# Patient Record
Sex: FEMALE | Race: AMERICAN INDIAN OR ALASKA NATIVE | Employment: OTHER | URBAN - METROPOLITAN AREA
[De-identification: names, ages, dates, MRNs, and addresses within clinical notes are randomized per-mention and may not be internally consistent; named-entity substitution may affect disease eponyms.]

---

## 2017-03-10 ENCOUNTER — HOSPITAL ENCOUNTER (EMERGENCY)
Age: 67
Discharge: HOME | End: 2017-03-10
Attending: EMERGENCY MEDICINE
Payer: MEDICARE

## 2017-03-10 ENCOUNTER — APPOINTMENT (OUTPATIENT)
Dept: GENERAL RADIOLOGY | Age: 67
End: 2017-03-10
Attending: EMERGENCY MEDICINE
Payer: MEDICARE

## 2017-03-10 VITALS
TEMPERATURE: 98.2 F | OXYGEN SATURATION: 100 % | BODY MASS INDEX: 40.05 KG/M2 | SYSTOLIC BLOOD PRESSURE: 118 MMHG | WEIGHT: 204 LBS | DIASTOLIC BLOOD PRESSURE: 92 MMHG | HEIGHT: 60 IN | HEART RATE: 72 BPM | RESPIRATION RATE: 23 BRPM

## 2017-03-10 DIAGNOSIS — J44.1 ACUTE EXACERBATION OF CHRONIC OBSTRUCTIVE PULMONARY DISEASE (COPD) (HCC): Primary | ICD-10-CM

## 2017-03-10 LAB
ANION GAP BLD CALC-SCNC: 6 MMOL/L (ref 3–18)
BASOPHILS # BLD AUTO: 0 K/UL (ref 0–0.1)
BASOPHILS # BLD: 1 % (ref 0–2)
BUN SERPL-MCNC: 16 MG/DL (ref 7–18)
BUN/CREAT SERPL: 17 (ref 12–20)
CALCIUM SERPL-MCNC: 8.9 MG/DL (ref 8.5–10.1)
CHLORIDE SERPL-SCNC: 105 MMOL/L (ref 100–108)
CO2 SERPL-SCNC: 28 MMOL/L (ref 21–32)
CREAT SERPL-MCNC: 0.95 MG/DL (ref 0.6–1.3)
DIFFERENTIAL METHOD BLD: ABNORMAL
EOSINOPHIL # BLD: 0.3 K/UL (ref 0–0.4)
EOSINOPHIL NFR BLD: 3 % (ref 0–5)
ERYTHROCYTE [DISTWIDTH] IN BLOOD BY AUTOMATED COUNT: 12.8 % (ref 11.6–14.5)
GLUCOSE SERPL-MCNC: 136 MG/DL (ref 74–99)
HCT VFR BLD AUTO: 41.8 % (ref 35–45)
HGB BLD-MCNC: 14.2 G/DL (ref 12–16)
LYMPHOCYTES # BLD AUTO: 26 % (ref 21–52)
LYMPHOCYTES # BLD: 2.1 K/UL (ref 0.9–3.6)
MCH RBC QN AUTO: 30.2 PG (ref 24–34)
MCHC RBC AUTO-ENTMCNC: 34 G/DL (ref 31–37)
MCV RBC AUTO: 88.9 FL (ref 74–97)
MONOCYTES # BLD: 0.9 K/UL (ref 0.05–1.2)
MONOCYTES NFR BLD AUTO: 11 % (ref 3–10)
NEUTS SEG # BLD: 4.8 K/UL (ref 1.8–8)
NEUTS SEG NFR BLD AUTO: 59 % (ref 40–73)
PLATELET # BLD AUTO: 250 K/UL (ref 135–420)
PMV BLD AUTO: 10.4 FL (ref 9.2–11.8)
POTASSIUM SERPL-SCNC: 4.8 MMOL/L (ref 3.5–5.5)
RBC # BLD AUTO: 4.7 M/UL (ref 4.2–5.3)
SODIUM SERPL-SCNC: 139 MMOL/L (ref 136–145)
WBC # BLD AUTO: 8.1 K/UL (ref 4.6–13.2)

## 2017-03-10 PROCEDURE — 77030012341 HC CHMB SPCR OPTC MDI VYRM -A

## 2017-03-10 PROCEDURE — 85025 COMPLETE CBC W/AUTO DIFF WBC: CPT | Performed by: EMERGENCY MEDICINE

## 2017-03-10 PROCEDURE — 93005 ELECTROCARDIOGRAM TRACING: CPT

## 2017-03-10 PROCEDURE — 74011000250 HC RX REV CODE- 250: Performed by: EMERGENCY MEDICINE

## 2017-03-10 PROCEDURE — 94640 AIRWAY INHALATION TREATMENT: CPT

## 2017-03-10 PROCEDURE — 80048 BASIC METABOLIC PNL TOTAL CA: CPT | Performed by: EMERGENCY MEDICINE

## 2017-03-10 PROCEDURE — 77030013140 HC MSK NEB VYRM -A

## 2017-03-10 PROCEDURE — 71010 XR CHEST PORT: CPT

## 2017-03-10 PROCEDURE — 99285 EMERGENCY DEPT VISIT HI MDM: CPT

## 2017-03-10 RX ORDER — ALBUTEROL SULFATE 0.83 MG/ML
2.5 SOLUTION RESPIRATORY (INHALATION)
Status: COMPLETED | OUTPATIENT
Start: 2017-03-10 | End: 2017-03-10

## 2017-03-10 RX ORDER — IPRATROPIUM BROMIDE AND ALBUTEROL SULFATE 2.5; .5 MG/3ML; MG/3ML
3 SOLUTION RESPIRATORY (INHALATION) ONCE
Status: COMPLETED | OUTPATIENT
Start: 2017-03-10 | End: 2017-03-10

## 2017-03-10 RX ADMIN — ALBUTEROL SULFATE 2.5 MG: 2.5 SOLUTION RESPIRATORY (INHALATION) at 20:26

## 2017-03-10 RX ADMIN — ALBUTEROL SULFATE 2.5 MG: 2.5 SOLUTION RESPIRATORY (INHALATION) at 21:48

## 2017-03-10 RX ADMIN — IPRATROPIUM BROMIDE AND ALBUTEROL SULFATE 3 ML: .5; 3 SOLUTION RESPIRATORY (INHALATION) at 20:26

## 2017-03-11 LAB
ATRIAL RATE: 71 BPM
CALCULATED P AXIS, ECG09: 70 DEGREES
CALCULATED R AXIS, ECG10: 49 DEGREES
CALCULATED T AXIS, ECG11: 88 DEGREES
DIAGNOSIS, 93000: NORMAL
P-R INTERVAL, ECG05: 188 MS
Q-T INTERVAL, ECG07: 402 MS
QRS DURATION, ECG06: 78 MS
QTC CALCULATION (BEZET), ECG08: 436 MS
VENTRICULAR RATE, ECG03: 71 BPM

## 2017-03-11 NOTE — DISCHARGE INSTRUCTIONS
Chronic Obstructive Pulmonary Disease (COPD): Care Instructions  Your Care Instructions    Chronic obstructive pulmonary disease (COPD) is a general term for a group of lung diseases, including emphysema and chronic bronchitis. People with COPD have decreased airflow in and out of the lungs, which makes it hard to breathe. The airways also can get clogged with thick mucus. Cigarette smoking is a major cause of COPD. Although there is no cure for COPD, you can slow its progress. Following your treatment plan and taking care of yourself can help you feel better and live longer. Follow-up care is a key part of your treatment and safety. Be sure to make and go to all appointments, and call your doctor if you are having problems. It's also a good idea to know your test results and keep a list of the medicines you take. How can you care for yourself at home? Staying healthy  · Do not smoke. This is the most important step you can take to prevent more damage to your lungs. If you need help quitting, talk to your doctor about stop-smoking programs and medicines. These can increase your chances of quitting for good. · Avoid colds and flu. Get a pneumococcal vaccine shot. If you have had one before, ask your doctor whether you need a second dose. Get the flu vaccine every fall. If you must be around people with colds or the flu, wash your hands often. · Avoid secondhand smoke, air pollution, and high altitudes. Also avoid cold, dry air and hot, humid air. Stay at home with your windows closed when air pollution is bad. Medicines and oxygen therapy  · Take your medicines exactly as prescribed. Call your doctor if you think you are having a problem with your medicine. · You may be taking medicines such as:  ¨ Bronchodilators. These help open your airways and make breathing easier. Bronchodilators are either short-acting (work for 6 to 9 hours) or long-acting (work for 24 hours).  You inhale most bronchodilators, so they start to act quickly. Always carry your quick-relief inhaler with you in case you need it while you are away from home. ¨ Corticosteroids (prednisone, budesonide). These reduce airway inflammation. They come in pill or inhaled form. You must take these medicines every day for them to work well. · A spacer may help you get more inhaled medicine to your lungs. Ask your doctor or pharmacist if a spacer is right for you. If it is, ask how to use it properly. · Do not take any vitamins, over-the-counter medicine, or herbal products without talking to your doctor first.  · If your doctor prescribed antibiotics, take them as directed. Do not stop taking them just because you feel better. You need to take the full course of antibiotics. · Oxygen therapy boosts the amount of oxygen in your blood and helps you breathe easier. Use the flow rate your doctor has recommended, and do not change it without talking to your doctor first.  Activity  · Get regular exercise. Walking is an easy way to get exercise. Start out slowly, and walk a little more each day. · Pay attention to your breathing. You are exercising too hard if you cannot talk while you are exercising. · Take short rest breaks when doing household chores and other activities. · Learn breathing methods--such as breathing through pursed lips--to help you become less short of breath. · If your doctor has not set you up with a pulmonary rehabilitation program, talk to him or her about whether rehab is right for you. Rehab includes exercise programs, education about your disease and how to manage it, help with diet and other changes, and emotional support. Diet  · Eat regular, healthy meals. Use bronchodilators about 1 hour before you eat to make it easier to eat. Eat several small meals instead of three large ones. Drink beverages at the end of the meal. Avoid foods that are hard to chew.   · Eat foods that contain protein so that you do not lose muscle mass.  Mental health  · Talk to your family, friends, or a therapist about your feelings. It is normal to feel frightened, angry, hopeless, helpless, and even guilty. Talking openly about bad feelings can help you cope. If these feelings last, talk to your doctor. When should you call for help? Call 911 anytime you think you may need emergency care. For example, call if:  · You have severe trouble breathing. Call your doctor now or seek immediate medical care if:  · You have new or worse trouble breathing. · You cough up blood. · You have a fever. Watch closely for changes in your health, and be sure to contact your doctor if:  · You cough more deeply or more often, especially if you notice more mucus or a change in the color of your mucus. · You have new or worse swelling in your legs or belly. · You are not getting better as expected. Where can you learn more? Go to http://ced-chato.info/. Ashish Long in the search box to learn more about \"Chronic Obstructive Pulmonary Disease (COPD): Care Instructions. \"  Current as of: May 23, 2016  Content Version: 11.1  © 2010-0850 [employer]. Care instructions adapted under license by [employer] (which disclaims liability or warranty for this information). If you have questions about a medical condition or this instruction, always ask your healthcare professional. [address] any warranty or liability for your use of this information.

## 2017-03-11 NOTE — ED TRIAGE NOTES
Pt,  C/o increase  Shortness of breath   This afternoon,  States  She had  COPD, short of breath most of the time

## 2017-03-11 NOTE — ED NOTES
Pt arrived to the ED with co SOB and cough. Pt has a PMH of asthma, COPD, Emphysema, and Bronchitis.  Pt denies CP

## 2017-03-11 NOTE — ED PROVIDER NOTES
HPI Comments: 8:13 PM Una Saucedo is a 78 y.o. female with a history of COPD, asthma, hypertension, GERD who presents to ED complaining of shortness of breath that started this afternoon. Patient states that she was coming out of the shower today and had difficulty breathing. Patient states that she used her Albuterol with no relief. Patient also reports an associated cough. Patient states that other than Albuterol, she has only taken her blood pressure medication. Patient denies chest pain, fevers, chills, leg swelling. No other complaints, associated symptoms or modifying factors at this time. PCP: Janny Mas MD    The history is provided by the patient. Past Medical History:   Diagnosis Date    Arthritis     Asthma     COPD (chronic obstructive pulmonary disease) ([address]     Gall stones     GERD (gastroesophageal reflux disease)     HTN (hypertension)     Kidney stone     Microscopic hematuria     Serious allergic reaction with severe difficult breathing 12/2011       Past Surgical History:   Procedure Laterality Date    COLONOSCOPY N/A 6/14/2016    COLONOSCOPY, DIAGNOSTIC w/bx performed by Nneka Monzon MD at [facility] ENDOSCOPY    HX CHOLECYSTECTOMY      HX GYN      HX ORTHOPAEDIC      HX UROLOGICAL  4/22/2014    [facility], Dr. Liliya Donis, Cystoscopy, left retrograde, left double-J catheter         Family History:   Problem Relation Age of Onset    Heart Disease Mother     Lung Disease Mother     Heart Disease Sister     Cancer Brother        Social History     Social History    Marital status:      Spouse name: N/A    Number of children: N/A    Years of education: N/A     Occupational History    Not on file.      Social History Main Topics    Smoking status: Former Smoker     Types: Cigarettes     Quit date: 3/24/2012    Smokeless tobacco: Not on file      Comment: 1 pk a day stopped    Alcohol use Yes      Comment: socially    Drug use: No    Sexual activity: Yes     Partners: Male     Other Topics Concern    Not on file     Social History Narrative    ** Merged History Encounter **              ALLERGIES: Prednisone    Review of Systems   Constitutional: Negative. Negative for activity change, appetite change, chills and fever. HENT: Negative for congestion, ear discharge, ear pain, facial swelling, nosebleeds, postnasal drip, sinus pressure, sneezing and tinnitus. Eyes: Negative for pain, discharge, redness and visual disturbance. Respiratory: Positive for cough and shortness of breath. Negative for apnea, choking, chest tightness, wheezing and stridor. Cardiovascular: Negative for chest pain and leg swelling. Gastrointestinal: Negative for abdominal distention, abdominal pain, anal bleeding, blood in stool, constipation, diarrhea, nausea and vomiting. Genitourinary: Negative for decreased urine volume, difficulty urinating, dyspareunia, dysuria, flank pain, frequency, hematuria, pelvic pain, urgency, vaginal bleeding and vaginal discharge. Musculoskeletal: Negative for arthralgias, back pain, gait problem, joint swelling, myalgias, neck pain and neck stiffness. Skin: Negative for color change. Neurological: Negative for dizziness, tremors, seizures, speech difficulty, weakness, numbness and headaches. Hematological: Negative for adenopathy. Does not bruise/bleed easily. Psychiatric/Behavioral: Negative for agitation, dysphoric mood and self-injury. The patient is not nervous/anxious. Vitals:    03/10/17 2030 03/10/17 2045 03/10/17 2100 03/10/17 2115   BP: 157/79 164/70 159/64 131/64   Pulse: 73 73 79 76   Resp: 30 19 18 19   Temp:       SpO2: 100% 98% 97% 96%   Weight:       Height:                Physical Exam   Constitutional: She is oriented to person, place, and time. She appears well-developed and well-nourished. Patient is overweight. HENT:   Head: Normocephalic and atraumatic.    Right Ear: External ear normal. Left Ear: External ear normal.   Nose: Nose normal.   Mouth/Throat: Oropharynx is clear and moist.   Eyes: Conjunctivae and EOM are normal. Pupils are equal, round, and reactive to light. Neck: Normal range of motion. Neck supple. Cardiovascular: Regular rhythm, normal heart sounds and intact distal pulses. Pulmonary/Chest: Effort normal. No respiratory distress. She has wheezes (2+). She has no rales. She exhibits no tenderness. Distant breath sounds. Abdominal: Soft. Bowel sounds are normal. She exhibits no distension and no mass. There is no tenderness. There is no rebound and no guarding. Musculoskeletal: Normal range of motion. She exhibits no edema. Neurological: She is alert and oriented to person, place, and time. Skin: Skin is warm and dry. No rash noted. No erythema. Psychiatric: She has a normal mood and affect. Her behavior is normal. Judgment normal.   Nursing note and vitals reviewed. MDM  Number of Diagnoses or Management Options  Acute exacerbation of chronic obstructive pulmonary disease (COPD) [facility]):   Diagnosis management comments: 78year old with COPD, presents with dyspnea and wheezing Shortness of breath noted. DDx CHF, COPD, clot, infection, hemorrhage, other etiologies. I reevaluated the patient at 21:40 and she informed me that her shortness of breath has improved. Will discharge patient.               Amount and/or Complexity of Data Reviewed  Clinical lab tests: ordered  Tests in the radiology section of CPT®: ordered    Risk of Complications, Morbidity, and/or Mortality  Presenting problems: moderate      ED Course       Procedures    Vitals:  Patient Vitals for the past 12 hrs:   Temp Pulse Resp BP SpO2   03/10/17 2115 - 76 19 131/64 96 %   03/10/17 2100 - 79 18 159/64 97 %   03/10/17 2045 - 73 19 164/70 98 %   03/10/17 2030 - 73 30 157/79 100 %   03/10/17 2015 - 73 21 136/82 96 %   03/10/17 2000 - 70 22 146/69 96 %   03/10/17 1931 - 77 (!) 31 143/64 99 % 03/10/17 1910 98.2 °F (36.8 °C) 76 24 186/77 95 %     99% on RA, indicating adequate oxygenation. Medications ordered:   Medications   inhalational spacing device (not administered)   albuterol (PROVENTIL VENTOLIN) nebulizer solution 2.5 mg (not administered)   albuterol-ipratropium (DUO-NEB) 2.5 MG-0.5 MG/3 ML (3 mL Nebulization Given 3/10/17 2026)   albuterol (PROVENTIL VENTOLIN) nebulizer solution 2.5 mg (2.5 mg Nebulization Given 3/10/17 2026)         Lab findings:  Recent Results (from the past 12 hour(s))   CBC WITH AUTOMATED DIFF    Collection Time: 03/10/17  7:45 PM   Result Value Ref Range    WBC 8.1 4.6 - 13.2 K/uL    RBC 4.70 4.20 - 5.30 M/uL    HGB 14.2 12.0 - 16.0 g/dL    HCT 41.8 35.0 - 45.0 %    MCV 88.9 74.0 - 97.0 FL    MCH 30.2 24.0 - 34.0 PG    MCHC 34.0 31.0 - 37.0 g/dL    RDW 12.8 11.6 - 14.5 %    PLATELET 288 224 - 411 K/uL    MPV 10.4 9.2 - 11.8 FL    NEUTROPHILS 59 40 - 73 %    LYMPHOCYTES 26 21 - 52 %    MONOCYTES 11 (H) 3 - 10 %    EOSINOPHILS 3 0 - 5 %    BASOPHILS 1 0 - 2 %    ABS. NEUTROPHILS 4.8 1.8 - 8.0 K/UL    ABS. LYMPHOCYTES 2.1 0.9 - 3.6 K/UL    ABS. MONOCYTES 0.9 0.05 - 1.2 K/UL    ABS. EOSINOPHILS 0.3 0.0 - 0.4 K/UL    ABS. BASOPHILS 0.0 0.0 - 0.1 K/UL    DF AUTOMATED     METABOLIC PANEL, BASIC    Collection Time: 03/10/17  7:45 PM   Result Value Ref Range    Sodium 139 136 - 145 mmol/L    Potassium 4.8 3.5 - 5.5 mmol/L    Chloride 105 100 - 108 mmol/L    CO2 28 21 - 32 mmol/L    Anion gap 6 3.0 - 18 mmol/L    Glucose 136 (H) 74 - 99 mg/dL    BUN 16 7.0 - 18 MG/DL    Creatinine 0.95 0.6 - 1.3 MG/DL    BUN/Creatinine ratio 17 12 - 20      GFR est AA >60 >60 ml/min/1.73m2    GFR est non-AA 59 (L) >60 ml/min/1.73m2    Calcium 8.9 8.5 - 10.1 MG/DL       EKG interpretation by ED Physician:  EKG was interpreted by me at 20:13 and showed nothing significant. No STEMI.      X-Ray, CT or other radiology findings or impressions:  XR CHEST PORT    (Results Pending)     XR CHEST PORT was interpreted by me at 21:07 and showed no acute changes. Patient does have an old fractured rib. Reevaluation of patient:   Patient informed me that her symptoms have improved. I informed the patient of the plan for discharge and follow up. Patient voiced understanding and was amendable to the proposed plan. All questions were answered. Diagnosis:   1. Acute exacerbation of chronic obstructive pulmonary disease (COPD) ([facility])        Disposition: Discharged in stable condition. I gave the patient strict verbal and written instructions for discharge, follow up, and to return to the emergency department for any new or worsening signs or symptoms. Follow-up Information     None           Patient's Medications   Start Taking    No medications on file   Continue Taking    ALBUTEROL (PROVENTIL HFA, VENTOLIN HFA, PROAIR HFA) 90 MCG/ACTUATION INHALER    Take 2 Puffs by inhalation every four (4) hours as needed for Wheezing or Shortness of Breath (Cough). CARVEDILOL (COREG) 25 MG TABLET        FLUTICASONE-SALMETEROL (ADVAIR DISKUS) 500-50 MCG/DOSE DISKUS INHALER    Take 1 Puff by inhalation every twelve (12) hours. INHALATIONAL SPACING DEVICE    ALWAYS USE WITH INHALER    POTASSIUM CITRATE (UROCIT-K 15) 15 MEQ TBER    Take 15 mEq by mouth two (2) times a day. QUINAPRIL (ACCUPRIL) 20 MG TABLET    Take 1 Tab by mouth nightly. ROSUVASTATIN (CRESTOR) 20 MG TABLET    Take 1 Tab by mouth nightly. These Medications have changed    No medications on file   Stop Taking    No medications on file       Scribe [address] [place] [address] for and in the presence of Chitra Freitas MD 8:14 PM, 03/10/17. Signed by: Malvin Owen[address], 8:14 PM, 03/10/17. Provider Attestation:   I personally performed the services described in the documentation, reviewed the documentation, as recorded by the scribe in my presence, and it accurately and completely records my words and actions.  March 10, 2017 at 9:53 PM - Marilu San MD

## 2018-11-18 ENCOUNTER — APPOINTMENT (OUTPATIENT)
Dept: CT IMAGING | Age: 68
DRG: 065 | End: 2018-11-18
Attending: EMERGENCY MEDICINE
Payer: MEDICARE

## 2018-11-18 ENCOUNTER — HOSPITAL ENCOUNTER (INPATIENT)
Age: 68
LOS: 2 days | Discharge: HOME HEALTH | DRG: 065 | End: 2018-11-20
Attending: EMERGENCY MEDICINE | Admitting: INTERNAL MEDICINE
Payer: MEDICARE

## 2018-11-18 ENCOUNTER — APPOINTMENT (OUTPATIENT)
Dept: MRI IMAGING | Age: 68
DRG: 065 | End: 2018-11-18
Attending: INTERNAL MEDICINE
Payer: MEDICARE

## 2018-11-18 ENCOUNTER — APPOINTMENT (OUTPATIENT)
Dept: GENERAL RADIOLOGY | Age: 68
DRG: 065 | End: 2018-11-18
Attending: EMERGENCY MEDICINE
Payer: MEDICARE

## 2018-11-18 DIAGNOSIS — G45.9 TIA (TRANSIENT ISCHEMIC ATTACK): Primary | ICD-10-CM

## 2018-11-18 LAB
ALBUMIN SERPL-MCNC: 4.2 G/DL (ref 3.4–5)
ALBUMIN/GLOB SERPL: 0.9 {RATIO} (ref 0.8–1.7)
ALP SERPL-CCNC: 90 U/L (ref 45–117)
ALT SERPL-CCNC: 59 U/L (ref 13–56)
ANION GAP SERPL CALC-SCNC: 6 MMOL/L (ref 3–18)
AST SERPL-CCNC: 44 U/L (ref 15–37)
ATRIAL RATE: 91 BPM
BASOPHILS # BLD: 0 K/UL (ref 0–0.06)
BASOPHILS NFR BLD: 0 % (ref 0–3)
BILIRUB SERPL-MCNC: 0.4 MG/DL (ref 0.2–1)
BUN SERPL-MCNC: 13 MG/DL (ref 7–18)
BUN/CREAT SERPL: 13 (ref 12–20)
CALCIUM SERPL-MCNC: 8.7 MG/DL (ref 8.5–10.1)
CALCULATED P AXIS, ECG09: 64 DEGREES
CALCULATED R AXIS, ECG10: 31 DEGREES
CALCULATED T AXIS, ECG11: 102 DEGREES
CHLORIDE SERPL-SCNC: 107 MMOL/L (ref 100–108)
CK MB CFR SERPL CALC: 2.2 % (ref 0–4)
CK MB SERPL-MCNC: 1 NG/ML (ref 5–25)
CK SERPL-CCNC: 45 U/L (ref 26–192)
CO2 SERPL-SCNC: 24 MMOL/L (ref 21–32)
CREAT SERPL-MCNC: 1 MG/DL (ref 0.6–1.3)
DIAGNOSIS, 93000: NORMAL
DIFFERENTIAL METHOD BLD: ABNORMAL
EOSINOPHIL # BLD: 0.2 K/UL (ref 0–0.4)
EOSINOPHIL NFR BLD: 2 % (ref 0–5)
ERYTHROCYTE [DISTWIDTH] IN BLOOD BY AUTOMATED COUNT: 12.5 % (ref 11.6–14.5)
GLOBULIN SER CALC-MCNC: 4.5 G/DL (ref 2–4)
GLUCOSE BLD STRIP.AUTO-MCNC: 176 MG/DL (ref 70–110)
GLUCOSE BLD STRIP.AUTO-MCNC: 176 MG/DL (ref 70–110)
GLUCOSE SERPL-MCNC: 170 MG/DL (ref 74–99)
HCT VFR BLD AUTO: 45.1 % (ref 35–45)
HGB BLD-MCNC: 15.5 G/DL (ref 12–16)
LYMPHOCYTES # BLD: 3.7 K/UL (ref 0.8–3.5)
LYMPHOCYTES NFR BLD: 38 % (ref 20–51)
MCH RBC QN AUTO: 29.8 PG (ref 24–34)
MCHC RBC AUTO-ENTMCNC: 34.4 G/DL (ref 31–37)
MCV RBC AUTO: 86.7 FL (ref 74–97)
MONOCYTES # BLD: 0.1 K/UL (ref 0–1)
MONOCYTES NFR BLD: 1 % (ref 2–9)
NEUTS SEG # BLD: 5.7 K/UL (ref 1.8–8)
NEUTS SEG NFR BLD: 59 % (ref 42–75)
P-R INTERVAL, ECG05: 186 MS
PLATELET # BLD AUTO: 250 K/UL (ref 135–420)
PLATELET COMMENTS,PCOM: ABNORMAL
PMV BLD AUTO: 10.5 FL (ref 9.2–11.8)
POTASSIUM SERPL-SCNC: 3.9 MMOL/L (ref 3.5–5.5)
PROT SERPL-MCNC: 8.7 G/DL (ref 6.4–8.2)
Q-T INTERVAL, ECG07: 360 MS
QRS DURATION, ECG06: 72 MS
QTC CALCULATION (BEZET), ECG08: 442 MS
RBC # BLD AUTO: 5.2 M/UL (ref 4.2–5.3)
RBC MORPH BLD: ABNORMAL
SODIUM SERPL-SCNC: 137 MMOL/L (ref 136–145)
TROPONIN I SERPL-MCNC: <0.02 NG/ML (ref 0–0.04)
VENTRICULAR RATE, ECG03: 91 BPM
WBC # BLD AUTO: 9.7 K/UL (ref 4.6–13.2)

## 2018-11-18 PROCEDURE — 74011250636 HC RX REV CODE- 250/636: Performed by: EMERGENCY MEDICINE

## 2018-11-18 PROCEDURE — 94640 AIRWAY INHALATION TREATMENT: CPT

## 2018-11-18 PROCEDURE — 85025 COMPLETE CBC W/AUTO DIFF WBC: CPT

## 2018-11-18 PROCEDURE — 82962 GLUCOSE BLOOD TEST: CPT

## 2018-11-18 PROCEDURE — 99285 EMERGENCY DEPT VISIT HI MDM: CPT

## 2018-11-18 PROCEDURE — 65660000000 HC RM CCU STEPDOWN

## 2018-11-18 PROCEDURE — 74011250636 HC RX REV CODE- 250/636: Performed by: INTERNAL MEDICINE

## 2018-11-18 PROCEDURE — 74011250637 HC RX REV CODE- 250/637: Performed by: EMERGENCY MEDICINE

## 2018-11-18 PROCEDURE — 74011250637 HC RX REV CODE- 250/637: Performed by: INTERNAL MEDICINE

## 2018-11-18 PROCEDURE — 70551 MRI BRAIN STEM W/O DYE: CPT

## 2018-11-18 PROCEDURE — 71045 X-RAY EXAM CHEST 1 VIEW: CPT

## 2018-11-18 PROCEDURE — 70450 CT HEAD/BRAIN W/O DYE: CPT

## 2018-11-18 PROCEDURE — 93005 ELECTROCARDIOGRAM TRACING: CPT

## 2018-11-18 PROCEDURE — 74011000250 HC RX REV CODE- 250: Performed by: INTERNAL MEDICINE

## 2018-11-18 PROCEDURE — 80053 COMPREHEN METABOLIC PANEL: CPT

## 2018-11-18 PROCEDURE — 82553 CREATINE MB FRACTION: CPT

## 2018-11-18 RX ORDER — GUAIFENESIN 100 MG/5ML
81 LIQUID (ML) ORAL DAILY
Status: DISCONTINUED | OUTPATIENT
Start: 2018-11-19 | End: 2018-11-19

## 2018-11-18 RX ORDER — LORAZEPAM 2 MG/ML
2 INJECTION INTRAMUSCULAR ONCE
Status: COMPLETED | OUTPATIENT
Start: 2018-11-18 | End: 2018-11-18

## 2018-11-18 RX ORDER — LABETALOL HYDROCHLORIDE 5 MG/ML
5 INJECTION, SOLUTION INTRAVENOUS
Status: DISCONTINUED | OUTPATIENT
Start: 2018-11-18 | End: 2018-11-20 | Stop reason: HOSPADM

## 2018-11-18 RX ORDER — GUAIFENESIN 100 MG/5ML
324 LIQUID (ML) ORAL
Status: COMPLETED | OUTPATIENT
Start: 2018-11-18 | End: 2018-11-18

## 2018-11-18 RX ORDER — ATORVASTATIN CALCIUM 40 MG/1
80 TABLET, FILM COATED ORAL
Status: DISCONTINUED | OUTPATIENT
Start: 2018-11-18 | End: 2018-11-20 | Stop reason: HOSPADM

## 2018-11-18 RX ORDER — FAMOTIDINE 20 MG/1
20 TABLET, FILM COATED ORAL 2 TIMES DAILY
Status: DISCONTINUED | OUTPATIENT
Start: 2018-11-18 | End: 2018-11-20 | Stop reason: HOSPADM

## 2018-11-18 RX ORDER — BUDESONIDE 0.5 MG/2ML
500 INHALANT ORAL
Status: DISCONTINUED | OUTPATIENT
Start: 2018-11-18 | End: 2018-11-18

## 2018-11-18 RX ORDER — HEPARIN SODIUM 5000 [USP'U]/ML
5000 INJECTION, SOLUTION INTRAVENOUS; SUBCUTANEOUS EVERY 8 HOURS
Status: DISCONTINUED | OUTPATIENT
Start: 2018-11-18 | End: 2018-11-20 | Stop reason: HOSPADM

## 2018-11-18 RX ORDER — ACETAMINOPHEN 325 MG/1
650 TABLET ORAL
Status: DISCONTINUED | OUTPATIENT
Start: 2018-11-18 | End: 2018-11-20 | Stop reason: HOSPADM

## 2018-11-18 RX ORDER — BUDESONIDE 0.5 MG/2ML
500 INHALANT ORAL
Status: DISCONTINUED | OUTPATIENT
Start: 2018-11-18 | End: 2018-11-20 | Stop reason: HOSPADM

## 2018-11-18 RX ORDER — CARVEDILOL 25 MG/1
25 TABLET ORAL 2 TIMES DAILY WITH MEALS
Status: DISCONTINUED | OUTPATIENT
Start: 2018-11-18 | End: 2018-11-20 | Stop reason: HOSPADM

## 2018-11-18 RX ORDER — IPRATROPIUM BROMIDE AND ALBUTEROL SULFATE 2.5; .5 MG/3ML; MG/3ML
3 SOLUTION RESPIRATORY (INHALATION)
Status: DISCONTINUED | OUTPATIENT
Start: 2018-11-18 | End: 2018-11-20 | Stop reason: HOSPADM

## 2018-11-18 RX ORDER — SODIUM CHLORIDE 9 MG/ML
125 INJECTION, SOLUTION INTRAVENOUS ONCE
Status: COMPLETED | OUTPATIENT
Start: 2018-11-18 | End: 2018-11-18

## 2018-11-18 RX ORDER — SODIUM CHLORIDE 0.9 % (FLUSH) 0.9 %
5-10 SYRINGE (ML) INJECTION AS NEEDED
Status: DISCONTINUED | OUTPATIENT
Start: 2018-11-18 | End: 2018-11-20 | Stop reason: HOSPADM

## 2018-11-18 RX ORDER — HEPARIN SODIUM 5000 [USP'U]/ML
5000 INJECTION, SOLUTION INTRAVENOUS; SUBCUTANEOUS EVERY 8 HOURS
Status: DISCONTINUED | OUTPATIENT
Start: 2018-11-18 | End: 2018-11-18

## 2018-11-18 RX ORDER — SODIUM CHLORIDE 0.9 % (FLUSH) 0.9 %
5-10 SYRINGE (ML) INJECTION EVERY 8 HOURS
Status: DISCONTINUED | OUTPATIENT
Start: 2018-11-18 | End: 2018-11-20 | Stop reason: HOSPADM

## 2018-11-18 RX ADMIN — CARVEDILOL 25 MG: 25 TABLET, FILM COATED ORAL at 20:22

## 2018-11-18 RX ADMIN — HEPARIN SODIUM 5000 UNITS: 5000 INJECTION, SOLUTION INTRAVENOUS; SUBCUTANEOUS at 21:33

## 2018-11-18 RX ADMIN — Medication 10 ML: at 17:45

## 2018-11-18 RX ADMIN — FAMOTIDINE 20 MG: 20 TABLET ORAL at 18:13

## 2018-11-18 RX ADMIN — ASPIRIN 81 MG 324 MG: 81 TABLET ORAL at 16:44

## 2018-11-18 RX ADMIN — LORAZEPAM 2 MG: 2 INJECTION INTRAMUSCULAR; INTRAVENOUS at 18:13

## 2018-11-18 RX ADMIN — IPRATROPIUM BROMIDE AND ALBUTEROL SULFATE 3 ML: .5; 3 SOLUTION RESPIRATORY (INHALATION) at 19:55

## 2018-11-18 RX ADMIN — ATORVASTATIN CALCIUM 80 MG: 40 TABLET, FILM COATED ORAL at 21:32

## 2018-11-18 RX ADMIN — Medication 10 ML: at 21:32

## 2018-11-18 RX ADMIN — BUDESONIDE 500 MCG: 0.5 INHALANT RESPIRATORY (INHALATION) at 20:40

## 2018-11-18 RX ADMIN — SODIUM CHLORIDE 125 ML/HR: 900 INJECTION, SOLUTION INTRAVENOUS at 16:48

## 2018-11-18 NOTE — PROGRESS NOTES
Problem: Falls - Risk of 
Goal: *Absence of Falls Document Dannial Shadow Fall Risk and appropriate interventions in the flowsheet. Outcome: Progressing Towards Goal 
Fall Risk Interventions: 
  
 
  
 
Medication Interventions: Bed/chair exit alarm, Patient to call before getting OOB Elimination Interventions: Bed/chair exit alarm, Toileting schedule/hourly rounds, Call light in reach History of Falls Interventions: Bed/chair exit alarm Problem: Pressure Injury - Risk of 
Goal: *Prevention of pressure injury Document Giorgio Scale and appropriate interventions in the flowsheet. Pressure Injury Interventions: 
Sensory Interventions: Assess changes in LOC Moisture Interventions: Absorbent underpads Activity Interventions: Pressure redistribution bed/mattress(bed type) Mobility Interventions: Turn and reposition approx. every two hours(pillow and wedges)

## 2018-11-18 NOTE — H&P
History & Physical 
Patient: Guille Mccann MRN: [id number]  CSN: [id number] YOB: 1950  Age: 80 y.o. Sex: female DOA: 11/18/2018 CC: left facial numbness and left-sided weakness HPI:  
 
Guille Mccann is a 80 y.o. female with medical co-morbidities listed below presented from home with acute on-set of left facial numbness with left-side weakness started around 1:30PM today. It started out as ringing in her left ear, thereafter the numbness in her face and left-side body. No dizziness or chest pain, or chest palpitation. She has never had this problem in the past. She is compliant with her blood pressure medications and cholesterol medication. Her symptoms improved by the time she came to the ER. In the ER, she was on STROKE alert. CT head was normal. She received aspirin 325mg. Her symptom improved hence she was not place on tPA. ROS: no fever/chills, no headache, no dizziness, no facial pain, no sinus congestion, No swallowing pain, No chest pain, no palpitation, no shortness of breath (wheezing), no abd pain, No diarrhea, no urinary complaint, no leg pain or swelling Past Medical History:  
Diagnosis Date  Arthritis  Asthma  COPD (chronic obstructive pulmonary disease) (HCC)  Gall stones  GERD (gastroesophageal reflux disease)  HTN (hypertension)  Kidney stone  Microscopic hematuria  Serious allergic reaction with severe difficult breathing 12/2011 Past Surgical History:  
Procedure Laterality Date  HX CHOLECYSTECTOMY  HX GYN    
 HX ORTHOPAEDIC    
 HX UROLOGICAL  4/22/2014 [facility], Dr. Ruiz Mullen, Cystoscopy, left retrograde, left double-J catheter Family History Problem Relation Age of Onset  Heart Disease Mother  Lung Disease Mother  Heart Disease Sister  Cancer Brother Social History Socioeconomic History  Marital status:  Spouse name: Not on file  Number of children: Not on file  Years of education: Not on file  Highest education level: Not on file Social Needs  Financial resource strain: Not on file  Food insecurity - worry: Not on file  Food insecurity - inability: Not on file  Transportation needs - medical: Not on file  Transportation needs - non-medical: Not on file Occupational History  Not on file Tobacco Use  Smoking status: Former Smoker Types: Cigarettes Last attempt to quit: 3/24/2012 Years since quittin.6  Tobacco comment: 1 pk a day stopped Substance and Sexual Activity  Alcohol use: Yes Comment: socially  Drug use: No  
 Sexual activity: Yes  
  Partners: Male Other Topics Concern  Not on file Social History Narrative ** Merged History Encounter **  
    
 
 
Prior to Admission medications Medication Sig Start Date End Date Taking? Authorizing Provider  
albuterol (PROVENTIL HFA, VENTOLIN HFA, PROAIR HFA) 90 mcg/actuation inhaler Take 2 Puffs by inhalation every four (4) hours as needed for Wheezing or Shortness of Breath (Cough). 6/15/16  Yes WALKER Griffin  
inhalational spacing device ALWAYS USE WITH INHALER 6/15/16  Yes WALKER Griffin  
carvedilol (COREG) 25 mg tablet  6/1/15  Yes Provider, Historical  
Potassium Citrate (UROCIT-K 15) 15 mEq TbER Take 15 mEq by mouth two (2) times a day. 6/5/15  Yes Ashwini Mcduffie MD  
rosuvastatin (CRESTOR) 20 mg tablet Take 1 Tab by mouth nightly. 6/10/14  Yes Sean Rizzo NP  
quinapril (ACCUPRIL) 20 mg tablet Take 1 Tab by mouth nightly. 13  Yes Kalin Montes NP  
fluticasone-salmeterol (ADVAIR DISKUS) 500-50 mcg/dose diskus inhaler Take 1 Puff by inhalation every twelve (12) hours. 14   Sean Rizzo NP Allergies Allergen Reactions  Prednisone Hives Physical Exam:  
  
Visit Vitals BP (!) 180/104 Pulse 88 Temp 98.2 °F (36.8 °C) Resp 20 Ht 5' (1.524 m) Wt 91.2 kg (201 lb) SpO2 98% BMI 39.26 kg/m² Physical Exam: 
Tele: sinus rhythm General:  Cooperative, Not in acute distress, speaks in full sentence while in bed HEENT: PERRL, EOMI, supple neck, no JVD, dry oral mucosa Cardiovascular: S1S2 regular, no rub/gallop Pulmonary: Clear air entry bilaterally, ++ wheezing, no crackle GI:  Soft, non tender, non distended, +bs, no guarding Extremities:  No pedal edema, +distal pulses appreciated Neuro: AOx3, moving all extremities. Left-side 4/5 strength in upper/lower extremities at proximal and distal ends. Sensation intact Lab/Data Review: 
Labs: Results:  
   
Chemistry Recent Labs 11/18/18 
1520 *   
K 3.9  CO2 24 BUN 13  
CREA 1.00  
CA 8.7 AGAP 6  
BUCR 13 AP 90  
TP 8.7* ALB 4.2 GLOB 4.5* AGRAT 0.9  
  
CBC w/Diff Recent Labs 11/18/18 
1520 WBC 9.7  
RBC 5.20 HGB 15.5 HCT 45.1*  
 GRANS 59 LYMPH 38 EOS 2 Coagulation No results for input(s): PTP, INR, APTT in the last 72 hours. No lab exists for component: INREXT Iron/Ferritin No results for input(s): IRON in the last 72 hours. No lab exists for component: TIBCCALC BNP No results for input(s): BNPP in the last 72 hours. Cardiac Enzymes Recent Labs 11/18/18 
1520 CPK 45 CKND1 2.2 Liver Enzymes Recent Labs 11/18/18 
1520 TP 8.7* ALB 4.2 AP 90 SGOT 44* Thyroid Studies Lab Results Component Value Date/Time TSH 4.66 06/10/2014 09:59 AM  
    
 
All Micro Results None Imaging Reviewed: 
CT head: FINDINGS:  
  
Axial unenhanced images of the brain were obtained from base to vertex. CT dose 
reduction was achieved through use of a standardized protocol tailored for this 
examination and automatic exposure control for dose modulation.  
  
The brain looks normal.  Ventricles and cortical sulci are within normal limits. No mass, hemorrhage, or evidence for acute infarction. No intra or extra-axial fluid collections are identified. Calvarium appears intact. Visualized paranasal sinuses and mastoid air cells are well aerated. 
  
IMPRESSION: 
Stroke alert: Brain appears normal. No acute findings. Assessment:  
 
1) Acute left-side weakness with improvement, likely TIA 
2) Hypertension 3) COPD with mild exacerbation 4) GERD 5) Obesity 6) hyperlipidemia Plan:  
 
1) Admitted to telemetry and stroke service. MRI brain ordered in ER. Will need Echo and carotid doppler. Started on aspirin, change rosuvastatin to atorvastatin 80mg, resume her carvedilol. Hold her ACEi for permissive HTN. Lipid panel, HgbA1c check. Neurology consult to follow up PT/OT ordered 2) Mild wheezing on exam, will start on duo-neb treatment Q6hrs. Can trial of budesonide neb as she has Advair at home, note: she has allergy to prednisone listed. 3) Anxiety of her medical situation, have ativan x1 prior to MRI brain. DVT prophylaxis: hep sq GI prophylaxis: pepcid FULL Code Dwana Habermann, MD 
11/18/2018, 5:44 PM

## 2018-11-18 NOTE — ROUTINE PROCESS
TRANSFER - OUT REPORT: 
 
Verbal report given to Ted Yepez RN on Ernesto Hardin  being transferred to [address] [place] [address] Drive, [phone] for routine progression of care. Report consisted of patients Situation, Background, Assessment and  
Recommendations(SBAR). Information from the following report(s) SBAR, ED Summary, Intake/Output, MAR and Cardiac Rhythm NSR was reviewed with the receiving nurse. Lines:  
Peripheral IV 11/18/18 Left Antecubital (Active) Site Assessment Clean, dry, & intact 11/18/2018  4:15 PM  
Phlebitis Assessment 0 11/18/2018  4:15 PM  
Infiltration Assessment 0 11/18/2018  4:15 PM  
Dressing Status Clean, dry, & intact 11/18/2018  4:15 PM  
Dressing Type Transparent 11/18/2018  4:15 PM  
Hub Color/Line Status Patent;Pink 11/18/2018  4:15 PM  
  
 
Opportunity for questions and clarification was provided. Patient transported with: 
 Monitor Registered Nurse

## 2018-11-18 NOTE — ED PROVIDER NOTES
EMERGENCY DEPARTMENT HISTORY AND PHYSICAL EXAM 
 
3:21 PM 
 
Date: 11/18/2018 Patient Name: Kiki Keita History of Presenting Illness Chief Complaint Patient presents with  Numbness Chief Complaint: left-sided numbness History Provided By: Patient Additional History (Context): Kiki Keita is a 80 y.o. female with COPD and asthma who presents to the ED for evaluation of left-sided numbness onset just prior to arrival. Associated ringing in left ear. Pt says that she was watching a football game when her left ear started ringing. Pt says that she then started to experience generalized numbness across her left side. Pt says that she was not exerting herself prior to the event. Pt says that all symptoms have started to resolve but are still present. EMS measured blood glucose as 170. PCP: Marian Garcia MD 
 
Current Outpatient Medications Medication Sig Dispense Refill  albuterol (PROVENTIL HFA, VENTOLIN HFA, PROAIR HFA) 90 mcg/actuation inhaler Take 2 Puffs by inhalation every four (4) hours as needed for Wheezing or Shortness of Breath (Cough). 1 Inhaler 0  
 inhalational spacing device ALWAYS USE WITH INHALER 1 Device 0  
 carvedilol (COREG) 25 mg tablet  Potassium Citrate (UROCIT-K 15) 15 mEq TbER Take 15 mEq by mouth two (2) times a day. 180 Tab 3  
 rosuvastatin (CRESTOR) 20 mg tablet Take 1 Tab by mouth nightly. 90 Tab 3  
 fluticasone-salmeterol (ADVAIR DISKUS) 500-50 mcg/dose diskus inhaler Take 1 Puff by inhalation every twelve (12) hours. 4 Inhaler 3  
 quinapril (ACCUPRIL) 20 mg tablet Take 1 Tab by mouth nightly. 90 Tab 3 Duration:  Minutes Timing:  Acute Location: general left side Quality: numbness Severity: Moderate Modifying Factors: none Associated Symptoms: ringing in left ear Past History Past Medical History: 
Past Medical History:  
Diagnosis Date  Arthritis  Asthma INR 2.0    5/26 INR 2.5  Alternating 3 and 4.5mg    COPD (chronic obstructive pulmonary disease) (HCC)  Gall stones  GERD (gastroesophageal reflux disease)  HTN (hypertension)  Kidney stone  Microscopic hematuria  Serious allergic reaction with severe difficult breathing 2011 Past Surgical History: 
Past Surgical History:  
Procedure Laterality Date  HX CHOLECYSTECTOMY  HX GYN    
 HX ORTHOPAEDIC    
 HX UROLOGICAL  2014 [facility], Dr. Nilda Arcos, Cystoscopy, left retrograde, left double-J catheter Family History: 
Family History Problem Relation Age of Onset  Heart Disease Mother  Lung Disease Mother  Heart Disease Sister  Cancer Brother Social History: 
Social History Tobacco Use  Smoking status: Former Smoker Types: Cigarettes Last attempt to quit: 3/24/2012 Years since quittin.6  Tobacco comment: 1 pk a day stopped Substance Use Topics  Alcohol use: Yes Comment: socially  Drug use: No  
 
 
Allergies: Allergies Allergen Reactions  Prednisone Hives Review of Systems Review of Systems Constitutional: Negative. Negative for activity change, chills and fever. HENT: Negative. Negative for congestion, nosebleeds, rhinorrhea and sinus pain. Eyes: Negative for discharge and redness. Respiratory: Negative. Negative for cough, shortness of breath and wheezing. Cardiovascular: Negative. Negative for chest pain and palpitations. Gastrointestinal: Negative. Negative for abdominal pain, diarrhea, nausea and vomiting. Endocrine: Negative. Genitourinary: Negative. Negative for difficulty urinating, frequency, hematuria and urgency. Musculoskeletal: Negative. Negative for back pain and neck pain. Skin: Negative. Negative for rash. Allergic/Immunologic: Negative. Neurological: Positive for numbness (generalized left side). Negative for syncope, facial asymmetry, speech difficulty and headaches. Hematological: Negative. Psychiatric/Behavioral: Negative. Negative for agitation and confusion. All other systems reviewed and are negative. Physical Exam  
 
Patient Vitals for the past 12 hrs: 
 Temp Pulse Resp BP SpO2  
11/18/18 1609     100 % 11/18/18 1533 98.2 °F (36.8 °C) 94 24 (!) 178/148 100 % Physical Exam  
Constitutional: She is oriented to person, place, and time. She appears well-developed and well-nourished. HENT:  
Head: Normocephalic and atraumatic. Eyes: Conjunctivae are normal. Pupils are equal, round, and reactive to light. Right eye exhibits no discharge. Left eye exhibits no discharge. No scleral icterus. Neck: Normal range of motion. Neck supple. Cardiovascular: Normal rate and regular rhythm. Exam reveals no gallop and no friction rub. No murmur heard. Pulmonary/Chest: Effort normal and breath sounds normal. No respiratory distress. She has no wheezes. Abdominal: Soft. Bowel sounds are normal. She exhibits no distension and no mass. There is no tenderness. There is no rebound and no guarding. Genitourinary:  
Genitourinary Comments: deferred Musculoskeletal: Normal range of motion. She exhibits no edema, tenderness or deformity. Lymphadenopathy:  
  She has no cervical adenopathy. Neurological: She is alert and oriented to person, place, and time. She has normal strength. No cranial nerve deficit or sensory deficit. She exhibits normal muscle tone. Pt has some difficulty w/ finger-to-nose test for left side. Skin: Skin is warm and dry. No rash noted. No erythema. Psychiatric: She has a normal mood and affect. Her behavior is normal.  
Nursing note and vitals reviewed. Diagnostic Study Results Labs - Recent Results (from the past 12 hour(s)) CBC WITH AUTOMATED DIFF Collection Time: 11/18/18  3:20 PM  
Result Value Ref Range WBC 9.7 4.6 - 13.2 K/uL  
 RBC 5.20 4. 20 - 5.30 M/uL  
 HGB 15.5 12.0 - 16.0 g/dL HCT 45.1 (H) 35.0 - 45.0 % MCV 86.7 74.0 - 97.0 FL  
 MCH 29.8 24.0 - 34.0 PG  
 MCHC 34.4 31.0 - 37.0 g/dL  
 RDW 12.5 11.6 - 14.5 % PLATELET 960 108 - 193 K/uL MPV 10.5 9.2 - 11.8 FL  
 NEUTROPHILS 59 42 - 75 % LYMPHOCYTES 38 20 - 51 % MONOCYTES 1 (L) 2 - 9 % EOSINOPHILS 2 0 - 5 % BASOPHILS 0 0 - 3 %  
 ABS. NEUTROPHILS 5.7 1.8 - 8.0 K/UL  
 ABS. LYMPHOCYTES 3.7 (H) 0.8 - 3.5 K/UL  
 ABS. MONOCYTES 0.1 0 - 1.0 K/UL  
 ABS. EOSINOPHILS 0.2 0.0 - 0.4 K/UL  
 ABS. BASOPHILS 0.0 0.0 - 0.06 K/UL  
 DF MANUAL PLATELET COMMENTS ADEQUATE PLATELETS    
 RBC COMMENTS NORMOCYTIC, NORMOCHROMIC METABOLIC PANEL, COMPREHENSIVE Collection Time: 11/18/18  3:20 PM  
Result Value Ref Range Sodium 137 136 - 145 mmol/L Potassium 3.9 3.5 - 5.5 mmol/L Chloride 107 100 - 108 mmol/L  
 CO2 24 21 - 32 mmol/L Anion gap 6 3.0 - 18 mmol/L Glucose 170 (H) 74 - 99 mg/dL BUN 13 7.0 - 18 MG/DL Creatinine 1.00 0.6 - 1.3 MG/DL  
 BUN/Creatinine ratio 13 12 - 20 GFR est AA >60 >60 ml/min/1.73m2 GFR est non-AA 55 (L) >60 ml/min/1.73m2 Calcium 8.7 8.5 - 10.1 MG/DL Bilirubin, total 0.4 0.2 - 1.0 MG/DL  
 ALT (SGPT) 59 (H) 13 - 56 U/L  
 AST (SGOT) 44 (H) 15 - 37 U/L Alk. phosphatase 90 45 - 117 U/L Protein, total 8.7 (H) 6.4 - 8.2 g/dL Albumin 4.2 3.4 - 5.0 g/dL Globulin 4.5 (H) 2.0 - 4.0 g/dL A-G Ratio 0.9 0.8 - 1.7 CARDIAC PANEL,(CK, CKMB & TROPONIN) Collection Time: 11/18/18  3:20 PM  
Result Value Ref Range CK 45 26 - 192 U/L  
 CK - MB 1.0 <3.6 ng/ml CK-MB Index 2.2 0.0 - 4.0 % Troponin-I, Qt. <0.02 0.0 - 0.045 NG/ML  
GLUCOSE, POC Collection Time: 11/18/18  3:32 PM  
Result Value Ref Range Glucose (POC) 176 (H) 70 - 110 mg/dL Radiologic Studies -  
 
CT HEAD W/O CONT: IMPRESSION: 
Stroke alert: Brain appears normal. No acute findings.  
Results relayed to the emergency room at 3:30 PM., Speaking with . 
 
XR CHEST:  
 
 CT HEAD WO CONT Final Result XR CHEST PORT    (Results Pending) MRI BRAIN WO CONT    (Results Pending) Medical Decision Making ED Course: Progress Notes, Reevaluation, and Consults: 
 
Consults:  
03:45 PM: Dr. Baljinder Ritter (teleneuro) agrees to evaluate for possible CVA. 04:42 PM : Dr. Dale Pineda (In-Pt. Neuro) agrees to follow Pt during her admission. Provider Notes (Medical Decision Making): Based on my history, physical exam, and diagnostic evaluation, the patient appears to have symptoms consistent with acute cerbral ischemia vs TIA. The pt's diagnostic evaluation including laboratory data, EKG, Head CT and x-ray were unremarkable. There is no eveidence of an intracranial bleed and glucose was normal. The pt does not meet criteria for tPa administration. However, based upon the history and clinical findings, The patient will require further workup for acute cerebral ischemia, The pt has been given aspirin in the ED, and an MRI brain is ordered. They have remained stable while in the emergency department. The patient is admitted and accepted by the admitting physician. Critical Care Time: Critical Care Time: The services I provided to this patient were to treat and/or prevent clinically significant deterioration that could result in the failure of one or more body systems and/or organ systems due to CVA/TIA. Services included the following: 
-reviewing nursing notes and old charts 
-vital sign assessments 
-direct patient care 
-medication orders and management 
-interpreting and reviewing diagnostic studies/labs 
-re-evaluations 
-documentation time Aggregate critical care time was 35 minutes, which includes only time during which I was engaged in work directly related to the patient's care as described above, whether I was at bedside or elsewhere in the Emergency Department.  It did not include time spent performing other reported procedures or the services of residents, students, nurses, or advance practice providers. Belem De La Paz MD 
 
5:24 PM 
 
 
 
 
Vital Signs-Reviewed the patient's vital signs. Pulse Oximetry Analysis -  100 on room air Cardiac Monitor:  
Rate: 94 Rhythm:  Normal Sinus Rhythm EC:48 PM: NSR at 91 T-wave inversions AVL which is uncahged from 2017  Records Reviewed: Nursing Notes and Old Medical Records (Time of Review: 3:21 PM) 
-I am the first provider for this patient. 
-I reviewed the vital signs, available nursing notes, past medical history, past surgical history, family history and social history. Diagnosis Clinical Impression: 1. TIA (transient ischemic attack) Disposition: Admit Follow-up Information None Medication List  
  
ASK your doctor about these medications   
albuterol 90 mcg/actuation inhaler Commonly known as:  PROVENTIL HFA, VENTOLIN HFA, PROAIR HFA Take 2 Puffs by inhalation every four (4) hours as needed for Wheezing or Shortness of Breath (Cough). carvedilol 25 mg tablet Commonly known as:  COREG 
  
fluticasone-salmeterol 500-50 mcg/dose diskus inhaler Commonly known as:  ADVAIR DISKUS Take 1 Puff by inhalation every twelve (12) hours. inhalational spacing device ALWAYS USE WITH INHALER Potassium Citrate Tber tablet Commonly known as:  UROCIT-K 15 Take 15 mEq by mouth two (2) times a day. quinapril 20 mg tablet Commonly known as:  ACCUPRIL Take 1 Tab by mouth nightly. rosuvastatin 20 mg tablet Commonly known as:  CRESTOR Take 1 Tab by mouth nightly. 
  
  
 
_______________________________ Attestations: 
Scribe Attestation Simin Pickens acting as a scribe for and in the presence of Belem De La Paz MD     
2018 at [facility] PM 
    
Provider Attestation:     
I personally performed the services described in the documentation, reviewed the documentation, as recorded by the scribe in my presence, and it accurately and completely records my words and actions. November 18, 2018 at 3:21 PM - Belem De La Paz MD   
_______________________________

## 2018-11-19 ENCOUNTER — APPOINTMENT (OUTPATIENT)
Dept: NON INVASIVE DIAGNOSTICS | Age: 68
DRG: 065 | End: 2018-11-19
Attending: INTERNAL MEDICINE
Payer: MEDICARE

## 2018-11-19 ENCOUNTER — APPOINTMENT (OUTPATIENT)
Dept: VASCULAR SURGERY | Age: 68
DRG: 065 | End: 2018-11-19
Attending: INTERNAL MEDICINE
Payer: MEDICARE

## 2018-11-19 LAB
CHOLEST SERPL-MCNC: 224 MG/DL
ECHO AO ROOT DIAM: 3.01 CM
ECHO LA AREA 4C: 14.4 CM2
ECHO LA VOL 2C: 38.56 ML (ref 22–52)
ECHO LA VOL 4C: 32.22 ML (ref 22–52)
ECHO LA VOL BP: 42.52 ML (ref 22–52)
ECHO LA VOL/BSA BIPLANE: 22.73 ML/M2 (ref 16–28)
ECHO LA VOLUME INDEX A2C: 20.61 ML/M2 (ref 16–28)
ECHO LA VOLUME INDEX A4C: 17.22 ML/M2 (ref 16–28)
ECHO LV INTERNAL DIMENSION DIASTOLIC: 4.22 CM (ref 3.9–5.3)
ECHO LV INTERNAL DIMENSION SYSTOLIC: 2.84 CM
ECHO LV IVSD: 1.34 CM (ref 0.6–0.9)
ECHO LV MASS 2D: 246.5 G (ref 67–162)
ECHO LV MASS INDEX 2D: 131.8 G/M2 (ref 43–95)
ECHO LV POSTERIOR WALL DIASTOLIC: 1.32 CM (ref 0.6–0.9)
ECHO LVOT DIAM: 1.86 CM
ECHO LVOT PEAK GRADIENT: 4.2 MMHG
ECHO LVOT PEAK VELOCITY: 102.59 CM/S
ECHO LVOT VTI: 23.73 CM
ECHO MV A VELOCITY: 122.6 CM/S
ECHO MV E DECELERATION TIME (DT): 235.5 MS
ECHO MV E VELOCITY: 0.89 CM/S
ECHO MV E/A RATIO: 0.01
ECHO PULMONARY ARTERY SYSTOLIC PRESSURE (PASP): 25 MMHG
ECHO PVEIN A VELOCITY: 0.41 CM/S
ECHO PVEIN PEAK D VELOCITY: 0.62 CM/S
ECHO PVEIN S/D RATIO: 43
ECHO TV REGURGITANT MAX VELOCITY: 233.45 CM/S
ECHO TV REGURGITANT PEAK GRADIENT: 21.8 MMHG
ERYTHROCYTE [DISTWIDTH] IN BLOOD BY AUTOMATED COUNT: 12.7 % (ref 11.6–14.5)
EST. AVERAGE GLUCOSE BLD GHB EST-MCNC: 243 MG/DL
GLUCOSE BLD STRIP.AUTO-MCNC: 151 MG/DL (ref 70–110)
GLUCOSE BLD STRIP.AUTO-MCNC: 180 MG/DL (ref 70–110)
GLUCOSE BLD STRIP.AUTO-MCNC: 188 MG/DL (ref 70–110)
HBA1C MFR BLD: 10.1 % (ref 4.2–5.6)
HCT VFR BLD AUTO: 40.8 % (ref 35–45)
HDLC SERPL-MCNC: 32 MG/DL (ref 40–60)
HDLC SERPL: 7 {RATIO} (ref 0–5)
HGB BLD-MCNC: 13.6 G/DL (ref 12–16)
LDLC SERPL CALC-MCNC: 145.4 MG/DL (ref 0–100)
LEFT CCA DIST DIAS: 15.38 CM/S
LEFT CCA DIST SYS: 79.33 CM/S
LEFT CCA MID DIAS: 10.2 CM/S
LEFT CCA MID SYS: 89.7 CM/S
LEFT CCA PROX DIAS: 17.1 CM/S
LEFT CCA PROX SYS: 117.28 CM/S
LEFT ECA DIAS: 5.01 CM/S
LEFT ECA SYS: 138.09 CM/S
LEFT ICA DIST DIAS: 17.1 CM/S
LEFT ICA DIST SYS: 70.65 CM/S
LEFT ICA MID DIAS: 13.8 CM/S
LEFT ICA MID SYS: 60.66 CM/S
LEFT ICA PROX DIAS: 33.42 CM/S
LEFT ICA PROX SYS: 134.47 CM/S
LEFT SUBCLAVIAN SYS: 92 CM/S
LEFT VERTEBRAL DIAS: 11.62 CM/S
LEFT VERTEBRAL SYS: 43.83 CM/S
LIPID PROFILE,FLP: ABNORMAL
MCH RBC QN AUTO: 29.3 PG (ref 24–34)
MCHC RBC AUTO-ENTMCNC: 33.3 G/DL (ref 31–37)
MCV RBC AUTO: 87.9 FL (ref 74–97)
PLATELET # BLD AUTO: 236 K/UL (ref 135–420)
PMV BLD AUTO: 10.5 FL (ref 9.2–11.8)
RBC # BLD AUTO: 4.64 M/UL (ref 4.2–5.3)
RIGHT CCA DIST DIAS: 19.52 CM/S
RIGHT CCA DIST SYS: 96.39 CM/S
RIGHT CCA MID DIAS: 22.01 CM/S
RIGHT CCA MID SYS: 108.36 CM/S
RIGHT CCA PROX DIAS: 10.86 CM/S
RIGHT CCA PROX SYS: 91.65 CM/S
RIGHT ECA DIAS: 7.69 CM/S
RIGHT ECA SYS: 100.34 CM/S
RIGHT ICA DIST DIAS: 23.16 CM/S
RIGHT ICA DIST SYS: 72.34 CM/S
RIGHT ICA MID DIAS: 20.58 CM/S
RIGHT ICA MID SYS: 47.77 CM/S
RIGHT ICA PROX DIAS: 15.57 CM/S
RIGHT ICA PROX SYS: 86.54 CM/S
RIGHT SUBCLAVIAN SYS: 184 CM/S
RIGHT VERTEBRAL DIAS: 8.93 CM/S
RIGHT VERTEBRAL SYS: 36.1 CM/S
TRIGL SERPL-MCNC: 233 MG/DL (ref ?–150)
VLDLC SERPL CALC-MCNC: 46.6 MG/DL
WBC # BLD AUTO: 8 K/UL (ref 4.6–13.2)

## 2018-11-19 PROCEDURE — 74011000250 HC RX REV CODE- 250: Performed by: INTERNAL MEDICINE

## 2018-11-19 PROCEDURE — 74011250637 HC RX REV CODE- 250/637: Performed by: INTERNAL MEDICINE

## 2018-11-19 PROCEDURE — 85027 COMPLETE CBC AUTOMATED: CPT

## 2018-11-19 PROCEDURE — 74011250636 HC RX REV CODE- 250/636: Performed by: INTERNAL MEDICINE

## 2018-11-19 PROCEDURE — 80061 LIPID PANEL: CPT

## 2018-11-19 PROCEDURE — 65660000000 HC RM CCU STEPDOWN

## 2018-11-19 PROCEDURE — 83036 HEMOGLOBIN GLYCOSYLATED A1C: CPT

## 2018-11-19 PROCEDURE — 74011636637 HC RX REV CODE- 636/637: Performed by: FAMILY MEDICINE

## 2018-11-19 PROCEDURE — 97165 OT EVAL LOW COMPLEX 30 MIN: CPT

## 2018-11-19 PROCEDURE — 74011000250 HC RX REV CODE- 250: Performed by: FAMILY MEDICINE

## 2018-11-19 PROCEDURE — 96374 THER/PROPH/DIAG INJ IV PUSH: CPT

## 2018-11-19 PROCEDURE — 82962 GLUCOSE BLOOD TEST: CPT

## 2018-11-19 PROCEDURE — 36415 COLL VENOUS BLD VENIPUNCTURE: CPT

## 2018-11-19 PROCEDURE — 93880 EXTRACRANIAL BILAT STUDY: CPT

## 2018-11-19 PROCEDURE — 94640 AIRWAY INHALATION TREATMENT: CPT

## 2018-11-19 PROCEDURE — 92610 EVALUATE SWALLOWING FUNCTION: CPT

## 2018-11-19 PROCEDURE — 97161 PT EVAL LOW COMPLEX 20 MIN: CPT

## 2018-11-19 RX ORDER — DEXTROSE 50 % IN WATER (D50W) INTRAVENOUS SYRINGE
25-50 AS NEEDED
Status: DISCONTINUED | OUTPATIENT
Start: 2018-11-19 | End: 2018-11-20 | Stop reason: HOSPADM

## 2018-11-19 RX ORDER — MAGNESIUM SULFATE 100 %
4 CRYSTALS MISCELLANEOUS AS NEEDED
Status: DISCONTINUED | OUTPATIENT
Start: 2018-11-19 | End: 2018-11-20 | Stop reason: HOSPADM

## 2018-11-19 RX ORDER — SODIUM CHLORIDE 9 MG/ML
10 INJECTION INTRAMUSCULAR; INTRAVENOUS; SUBCUTANEOUS
Status: COMPLETED | OUTPATIENT
Start: 2018-11-19 | End: 2018-11-19

## 2018-11-19 RX ORDER — INSULIN LISPRO 100 [IU]/ML
INJECTION, SOLUTION INTRAVENOUS; SUBCUTANEOUS
Status: DISCONTINUED | OUTPATIENT
Start: 2018-11-19 | End: 2018-11-20 | Stop reason: HOSPADM

## 2018-11-19 RX ORDER — ASPIRIN 325 MG
325 TABLET ORAL DAILY
Status: DISCONTINUED | OUTPATIENT
Start: 2018-11-20 | End: 2018-11-20 | Stop reason: HOSPADM

## 2018-11-19 RX ADMIN — CARVEDILOL 25 MG: 25 TABLET, FILM COATED ORAL at 08:33

## 2018-11-19 RX ADMIN — BUDESONIDE 500 MCG: 0.5 INHALANT RESPIRATORY (INHALATION) at 20:49

## 2018-11-19 RX ADMIN — Medication 10 ML: at 17:14

## 2018-11-19 RX ADMIN — HEPARIN SODIUM 5000 UNITS: 5000 INJECTION, SOLUTION INTRAVENOUS; SUBCUTANEOUS at 06:20

## 2018-11-19 RX ADMIN — INSULIN LISPRO 2 UNITS: 100 INJECTION, SOLUTION INTRAVENOUS; SUBCUTANEOUS at 17:15

## 2018-11-19 RX ADMIN — FAMOTIDINE 20 MG: 20 TABLET ORAL at 08:33

## 2018-11-19 RX ADMIN — ATORVASTATIN CALCIUM 80 MG: 40 TABLET, FILM COATED ORAL at 22:55

## 2018-11-19 RX ADMIN — ASPIRIN 81 MG CHEWABLE TABLET 81 MG: 81 TABLET CHEWABLE at 08:33

## 2018-11-19 RX ADMIN — Medication 10 ML: at 06:20

## 2018-11-19 RX ADMIN — SODIUM CHLORIDE 10 ML: 9 INJECTION INTRAMUSCULAR; INTRAVENOUS; SUBCUTANEOUS at 16:09

## 2018-11-19 RX ADMIN — IPRATROPIUM BROMIDE AND ALBUTEROL SULFATE 3 ML: .5; 3 SOLUTION RESPIRATORY (INHALATION) at 13:35

## 2018-11-19 RX ADMIN — IPRATROPIUM BROMIDE AND ALBUTEROL SULFATE 3 ML: .5; 3 SOLUTION RESPIRATORY (INHALATION) at 20:49

## 2018-11-19 RX ADMIN — IPRATROPIUM BROMIDE AND ALBUTEROL SULFATE 3 ML: .5; 3 SOLUTION RESPIRATORY (INHALATION) at 01:15

## 2018-11-19 RX ADMIN — HEPARIN SODIUM 5000 UNITS: 5000 INJECTION, SOLUTION INTRAVENOUS; SUBCUTANEOUS at 22:56

## 2018-11-19 RX ADMIN — Medication 10 ML: at 22:56

## 2018-11-19 RX ADMIN — HEPARIN SODIUM 5000 UNITS: 5000 INJECTION, SOLUTION INTRAVENOUS; SUBCUTANEOUS at 17:14

## 2018-11-19 RX ADMIN — INSULIN LISPRO 2 UNITS: 100 INJECTION, SOLUTION INTRAVENOUS; SUBCUTANEOUS at 23:02

## 2018-11-19 NOTE — PROGRESS NOTES
Problem: Mobility Impaired (Adult and Pediatric) Goal: *Acute Goals and Plan of Care (Insert Text) Outcome: Resolved/Met Date Met: 11/19/18 physical Therapy EVALUATION & Discharge Patient: Calista Kolb (74 y.o. female) Date: 11/19/2018 Primary Diagnosis: TIA (transient ischemic attack) Precautions: Fall ASSESSMENT AND RECOMMENDATIONS: 
Patient is 71yo F admitted to hospital for TIA and presents today alert and agreeable to therapy and was supine in bed upon arrival. Patient transferred to sitting EOB for objective assessment and stood from bed with cues not to pull from walker with good carryover. Patient ambulated 200ft with RW at modified independent without dizziness, SOB, pain, and reports her walking is at baseline. Patient reports some lingering left sided facial numbness with resolution of other symptoms. Patient transferred to sitting WESLEY and was educated on recommendations for OP PT and continued walker use at home as is her baseline; she acknowledged understanding. Patient was left resting supine with call bell by her side. Encouraged patient to continue getting up with nursing staff and RW and not to get up without assist. 
Skilled physical therapy is not indicated at this time. Discharge Recommendations: Outpatient Further Equipment Recommendations for Discharge: rolling walker G-:CODES Mobility [id number] Current  CI= 1-19%   Goal  CI= 1-19%  D/C  CI= 1-19%. The severity rating is based on the Level of Assistance required for Functional Mobility and ADLs. Evaluation Complexity Eval Complexity: History: MEDIUM  Complexity : 1-2 comorbidities / personal factors will impact the outcome/ POC Exam:LOW Complexity : 1-2 Standardized tests and measures addressing body structure, function, activity limitation and / or participation in recreation  Presentation: LOW Complexity : Stable, uncomplicated  Clinical Decision Making:Low Complexity   Overall Complexity:LOW SUBJECTIVE:  
Patient stated I'm feeling a lot better than yesterday.  OBJECTIVE DATA SUMMARY:  
 
Past Medical History:  
Diagnosis Date  Arthritis  Asthma  COPD (chronic obstructive pulmonary disease) (HCC)  Gall stones  GERD (gastroesophageal reflux disease)  HTN (hypertension)  Kidney stone  Microscopic hematuria  Serious allergic reaction with severe difficult breathing 12/2011 Past Surgical History:  
Procedure Laterality Date  HX CHOLECYSTECTOMY  HX GYN    
 HX ORTHOPAEDIC    
 HX UROLOGICAL  [facility], Dr. Bon Landis, Cystoscopy, left retrograde, left double-J catheter Barriers to Learning/Limitations: None Compensate with: N/A Prior Level of Function/Home Situation: Patient lives with  in 1 story home with elevator to her apartment. Patient reports she uses Rollator for mobility and was independent with I/ADL's PTA. Home Situation Home Environment: Apartment # Steps to Enter: (elevator) One/Two Story Residence: One story Living Alone: No 
Support Systems: Spouse/Significant Other/Partner Patient Expects to be Discharged to[de-identified] Private residence Current DME Used/Available at Home: Walker, rollingCritical Behavior: A&Ox4 Strength:   
Strength: Within functional limits(BLE) Tone & Sensation:  
Tone: Normal(BLE) Sensation: Intact(BLE) Range Of Motion: 
AROM: Within functional limits(BLE) Functional Mobility: 
Bed Mobility: 
Rolling: Independent Supine to Sit: Modified independent Sit to Supine: Modified independent Scooting: Modified independent Transfers: 
Sit to Stand: Modified independent Stand to Sit: Modified independent Balance:  
Sitting: Intact Standing: Intact; With supportAmbulation/Gait Training: 
Distance (ft): 200 Feet (ft) Assistive Device: Walker, rolling Ambulation - Level of Assistance: Modified independent Speed/Ana: Slow Interventions: Verbal cues Pain: Pt reports 0/10 pain or discomfort prior to treatment.   
Pt reports 0/10 pain or discomfort post treatment. Activity Tolerance:  
Patient tolerated activity well with no dizziness, chest pain, or SOB. Please refer to the flowsheet for vital signs taken during this treatment. After treatment:  
[]         Patient left in no apparent distress sitting up in chair 
[x]         Patient left in no apparent distress in bed 
[x]         Call bell left within reach 
[]         Nursing notified 
[x]         Caregiver present 
[]         Bed alarm activated 
[]         SCDs applied to B LE 
 
COMMUNICATION/EDUCATION:  
[x]         Fall prevention education was provided and the patient/caregiver indicated understanding. [x]         Patient/family have participated as able in goal setting and plan of care. [x]         Patient/family agree to work toward stated goals and plan of care. []         Patient understands intent and goals of therapy, but is neutral about his/her participation. []         Patient is unable to participate in goal setting and plan of care. Thank you for this referral. 
Zohreh Thornton, PT Time Calculation: 15 mins

## 2018-11-19 NOTE — CDMP QUERY
Please clarify if this patient is being treated/managed for: 
 
=>   left sided hemiparesis in setting of new stroke    with PT, OT ordered  
 
=>Other Explanation of clinical findings =>Unable to Determine (no explanation of clinical findings) The medical record reflects the following: 
 
Risk:   HTN Clinical Indicators:  per H&P  \"   left ear started ringing. Pt says that she then started to experience generalized numbness across her in her face and left-side body. \" 
 
Treatment: neuro consult;  PT/ OT consults Hemiplegia is not inherent to an acute cerebrovascular accident (CVA). Therefore, it should be coded even if the hemiplegia resolves, with or without treatment. The hemiplegia affects the care that the patient receives. Report any neurological deficits caused by a CVA even when they have been resolved at the time of discharge from the hospital. 
 
 
 
If you DECLINE this query or would like to communicate with [facility], please utilize the \"[facility] message box\" at the TOP of the Progress Note on the right. Thank you,   Jessica Mckeon RN   CCDS  x [phone]

## 2018-11-19 NOTE — PROGRESS NOTES
conducted an initial consultation and Spiritual Assessment for He Shelton, who is a 80 y.o.,female. Patients Primary Language is: [place]. According to the patients EMR Denominational Affiliation is: [facility].  
 
The reason the Patient came to the hospital is:  
Patient Active Problem List  
 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 
Initiated a relationship of care and support. Patient indicated she is doing all right. Her  was visiting. Explored issues of peggy, spirituality and/or Buddhist needs while hospitalized. They are Renetta Fry and  provided literature. Listened empathically.  stayed last night and said he has no transportation except from relatives. He hopes one will visit today Provided chaplaincy education. Provided information about Spiritual Care Services. Offered assurance of continued prayers on patient's behalf. Chart reviewed. The following outcomes were achieved: 
Patient shared some information about their medical narrative and spiritual journey/beliefs. Patient processed feeling about current hospitalization. Patient expressed gratitude for the 's visit. Assessment: 
Patient did not indicate any spiritual or Buddhist issues which require Spiritual Care Services interventions at this time. Patient does not have any Buddhist/cultural needs that will affect patients preferences in health care. Plan: Chaplains will continue to follow and will provide pastoral care on an as needed or requested basis.  recommends bedside caregivers page  on duty if patient shows signs of acute spiritual or emotional distress. Claudio Gutiérrez MDiv. Board Certified [employer] Office [phone]

## 2018-11-19 NOTE — PROGRESS NOTES
Problem: Self Care Deficits Care Plan (Adult) Goal: *Acute Goals and Plan of Care (Insert Text) Outcome: Resolved/Met Date Met: 11/19/18 Occupational Therapy EVALUATION/discharge Patient: He Shelton (30 y.o. female) Date: 11/19/2018 Primary Diagnosis: TIA (transient ischemic attack) ASSESSMENT AND RECOMMENDATIONS: 
Mrs. Thao Courser is a pleasant 80 yr old female admitted to the hospital 11-18-18 with L facial numbness and L sided weakness. A CT of the head did not reveal any acute neurological findings. During the OT eval today, the pt completed sit to stand, lower body dressing, and grooming in standing at sink level independently; the pt completed ambulation and a toilet transfer with modified independence. The pt was noted to present with mild L sided numbness and mildly impaired dynamic standing balance. As such, the pt would benefit from further home health PT vs. outpatient PT services to address her balance and functional mobility deficits. OT did however educate the pt on the use of a shower chair for added safety and decreased falls risk, given her balance deficits; the pt verbalized understanding. The pt does not require further OT services, therefore OT will sign off & recommend the pt return home at discharge. Skilled occupational therapy is not indicated at this time. Discharge Recommendations: Outpatient vs. Home Health Physical Therapy Further Equipment Recommendations for Discharge: N/A Barriers to Learning/Limitations: None Compensate with: visual, verbal, tactile, kinesthetic cues/model COMPLEXITY Eval Complexity: History: LOW Complexity : Brief history review ; Examination: LOW Complexity : 1-3 performance deficits relating to physical, cognitive , or psychosocial skils that result in activity limitations and / or participation restrictions ;  Decision Making:LOW Complexity : No comorbidities that affect functional and no verbal or physical assistance needed to complete eval tasks  Assessment: Low Complexity G-CODES:  
 
Self Care  Current  CI= 1-19%  Goal  CI= 1-19%  D/C  CI= 1-19%. The severity rating is based on the Level of Assistance required for Functional Mobility and ADLs. SUBJECTIVE:  
Patient stated I was hoping they would let me leave today.  OBJECTIVE DATA SUMMARY:  
 
Past Medical History:  
Diagnosis Date  Arthritis  Asthma  COPD (chronic obstructive pulmonary disease) (HCC)  Gall stones  GERD (gastroesophageal reflux disease)  HTN (hypertension)  Kidney stone  Microscopic hematuria  Serious allergic reaction with severe difficult breathing 12/2011 Past Surgical History:  
Procedure Laterality Date  HX CHOLECYSTECTOMY  HX GYN    
 HX ORTHOPAEDIC    
 HX UROLOGICAL  4/22/2014 [facility], Dr. Jina Campbell, Cystoscopy, left retrograde, left double-J catheter Prior Level of Function/Home Situation: Patient was independent with ADLs and she shared household chores with her spouse. The pt does not drive, and she used a rollator for ambulating longer distances. Home Situation Home Environment: Apartment(senior living apartments) # Steps to Enter: 0(elevator) One/Two Story Residence: One story Living Alone: No(Lives with her spouse.) Support Systems: Spouse/Significant Other/Partner Patient Expects to be Discharged to[de-identified] Private residence Current DME Used/Available at Home: Amanda Bongo, straight, Walker, rollator, Wheelchair Tub or Shower Type: (walk-in shower with grab bars) [x]     Right hand dominant   []     Left hand dominantCognitive/Behavioral Status: 
Neurologic State: Alert Orientation Level: Oriented X4 Cognition: Appropriate decision making; Appropriate for age attention/concentration; Appropriate safety awareness; Follows commands Safety/Judgement: Good awareness of safety precautions Skin: Visible skin appeared intact Edema: None noted Vision/Perceptual:   
Tracking: Able to track stimulus in all quadrants w/o difficulty Acuity: Able to read clock/calendar on wall without difficulty; Pt also denied acute vision changes or deficits. Coordination: 
Coordination: Within functional limits for BUE and BLE; BUE digit opposition WNL Balance: 
Sitting: Intact Sitting - Static: (normal) Sitting - Dynamic: (good+) Standing: Intact; With support Standing - Static: (normal) Standing - Dynamic : (good) Strength: 
Strength: Within functional limits(RUE 5/5 throughout. LUE 4/5 grossly) Tone & Sensation: 
Tone: Normal(BUE and BLE) Sensation: (Pt reports having mild left sided numbness.) Range of Motion: 
AROM: Within functional limits(BUE and BLE) Functional Mobility and Transfers for ADLs: 
Bed Mobility: 
Rolling: Independent Supine to Sit: Modified independent Sit to Supine: Modified independent Scooting: Modified independent Transfers: 
Sit to Stand: Independent Toilet Transfer : Modified independent ADL Assessment: 
Feeding: Modified independent Oral Facial Hygiene/Grooming: Independent(hand washing in standing at sink level) Bathing: Modified independent Upper Body Dressing: Independent Lower Body Dressing: Independent Toileting: Independent Cognitive Retraining Safety/Judgement: Good awareness of safety precautions Pain: 
Pt reports 0/10 pain or discomfort prior to treatment.   
Pt reports 0/10 pain or discomfort post treatment. Activity Tolerance:  
good Please refer to the flowsheet for vital signs taken during this treatment. After treatment:  
[]  Patient left in no apparent distress sitting up in chair 
[x]  Patient left in no apparent distress in bed 
[x]  Call bell left within reach 
[]  Nursing notified 
[x]  Spouse present 
[]  Bed alarm activated COMMUNICATION/EDUCATION:  
Communication/Collaboration: [x]      Home safety education was provided and the patient/caregiver indicated understanding. [x]      Patient/family have participated as able and agree with findings and recommendations. []      Patient is unable to participate in plan of care at this time. Naheed Londono OT Time Calculation: 15 mins

## 2018-11-19 NOTE — PROGRESS NOTES
[facility] Hospitalist Group Progress Note Patient: Maida Doe Age: 80 y.o. : 1950 MR#: [id number] SSN: [id number] Date/Time: 2018 12:17 PM 
 
Subjective/24-hour events: No new issues overnight. Wants to know when she can go home. Assessment:  
Acute R thalamic CVA Uncontrolled DM 2 with hyperglycemia Dyslipidemia HTN 
COPD 
GERD Obesity Plan: MRI results noted. Await neurology recs - appreciated in advance. Diabetes educator mariajose. 
PT/OT recommending [facility] 79 vs outpatient therapy. Anticipate disposition soon if stable. Case discussed with:  [x]Patient  []Family  []Nursing  []Case Management DVT Prophylaxis:  []Lovenox  []Hep SQ  []SCDs  []Coumadin   []On Heparin gtt Objective:  
VS:  
Visit Vitals /65 (BP 1 Location: Left arm, BP Patient Position: Supine) Pulse 74 Temp 97.4 °F (36.3 °C) Resp 16 Ht 5' (1.524 m) Wt 91.2 kg (201 lb) SpO2 94% BMI 39.26 kg/m² Tmax/24hrs: Temp (24hrs), Av.9 °F (36.6 °C), Min:97.4 °F (36.3 °C), Max:98.3 °F (36.8 °C) Intake/Output Summary (Last 24 hours) at 2018 1217 Last data filed at 2018 1151 Gross per 24 hour Intake 960 ml Output 750 ml Net 210 ml General:  In NAD. Nontoxic-appearing. Cardiovascular:  RRR. Pulmonary:  Clear, no wheezes. GI:  Abdomen soft, NTTP. Extremities:  Warm, no ischemia. Neuro:  Awake and alert, motor nonfocal.  
 
Labs:   
Recent Results (from the past 24 hour(s)) CBC WITH AUTOMATED DIFF Collection Time: 18  3:20 PM  
Result Value Ref Range WBC 9.7 4.6 - 13.2 K/uL  
 RBC 5.20 4. 20 - 5.30 M/uL  
 HGB 15.5 12.0 - 16.0 g/dL HCT 45.1 (H) 35.0 - 45.0 % MCV 86.7 74.0 - 97.0 FL  
 MCH 29.8 24.0 - 34.0 PG  
 MCHC 34.4 31.0 - 37.0 g/dL  
 RDW 12.5 11.6 - 14.5 % PLATELET 064 029 - 254 K/uL MPV 10.5 9.2 - 11.8 FL  
 NEUTROPHILS 59 42 - 75 % LYMPHOCYTES 38 20 - 51 % MONOCYTES 1 (L) 2 - 9 % EOSINOPHILS 2 0 - 5 % BASOPHILS 0 0 - 3 %  
 ABS. NEUTROPHILS 5.7 1.8 - 8.0 K/UL  
 ABS. LYMPHOCYTES 3.7 (H) 0.8 - 3.5 K/UL  
 ABS. MONOCYTES 0.1 0 - 1.0 K/UL  
 ABS. EOSINOPHILS 0.2 0.0 - 0.4 K/UL  
 ABS. BASOPHILS 0.0 0.0 - 0.06 K/UL  
 DF MANUAL PLATELET COMMENTS ADEQUATE PLATELETS    
 RBC COMMENTS NORMOCYTIC, NORMOCHROMIC METABOLIC PANEL, COMPREHENSIVE Collection Time: 11/18/18  3:20 PM  
Result Value Ref Range Sodium 137 136 - 145 mmol/L Potassium 3.9 3.5 - 5.5 mmol/L Chloride 107 100 - 108 mmol/L  
 CO2 24 21 - 32 mmol/L Anion gap 6 3.0 - 18 mmol/L Glucose 170 (H) 74 - 99 mg/dL BUN 13 7.0 - 18 MG/DL Creatinine 1.00 0.6 - 1.3 MG/DL  
 BUN/Creatinine ratio 13 12 - 20 GFR est AA >60 >60 ml/min/1.73m2 GFR est non-AA 55 (L) >60 ml/min/1.73m2 Calcium 8.7 8.5 - 10.1 MG/DL Bilirubin, total 0.4 0.2 - 1.0 MG/DL  
 ALT (SGPT) 59 (H) 13 - 56 U/L  
 AST (SGOT) 44 (H) 15 - 37 U/L Alk. phosphatase 90 45 - 117 U/L Protein, total 8.7 (H) 6.4 - 8.2 g/dL Albumin 4.2 3.4 - 5.0 g/dL Globulin 4.5 (H) 2.0 - 4.0 g/dL A-G Ratio 0.9 0.8 - 1.7 CARDIAC PANEL,(CK, CKMB & TROPONIN) Collection Time: 11/18/18  3:20 PM  
Result Value Ref Range CK 45 26 - 192 U/L  
 CK - MB 1.0 <3.6 ng/ml CK-MB Index 2.2 0.0 - 4.0 % Troponin-I, Qt. <0.02 0.0 - 0.045 NG/ML  
GLUCOSE, POC Collection Time: 11/18/18  3:32 PM  
Result Value Ref Range Glucose (POC) 176 (H) 70 - 110 mg/dL EKG, 12 LEAD, INITIAL Collection Time: 11/18/18  4:48 PM  
Result Value Ref Range Ventricular Rate 91 BPM  
 Atrial Rate 91 BPM  
 P-R Interval 186 ms QRS Duration 72 ms Q-T Interval 360 ms QTC Calculation (Bezet) 442 ms Calculated P Axis 64 degrees Calculated R Axis 31 degrees Calculated T Axis 102 degrees Diagnosis Normal sinus rhythm Anteroseptal infarct (cited on or before 10-MAR-2017) T wave abnormality, consider lateral ischemia Abnormal ECG 
 When compared with ECG of 10-MAR-2017 19:27, No significant change was found Confirmed by Jaden Carbajal MD, Góme[place] Joce ([id number]) on 11/18/2018 7:36:02 PM 
  
GLUCOSE, POC Collection Time: 11/18/18  9:31 PM  
Result Value Ref Range Glucose (POC) 176 (H) 70 - 110 mg/dL LIPID PANEL Collection Time: 11/19/18  3:05 AM  
Result Value Ref Range LIPID PROFILE Cholesterol, total 224 (H) <200 MG/DL Triglyceride 233 (H) <150 MG/DL  
 HDL Cholesterol 32 (L) 40 - 60 MG/DL  
 LDL, calculated 145.4 (H) 0 - 100 MG/DL VLDL, calculated 46.6 MG/DL  
 CHOL/HDL Ratio 7.0 (H) 0 - 5.0 HEMOGLOBIN A1C WITH EAG Collection Time: 11/19/18  3:05 AM  
Result Value Ref Range Hemoglobin A1c 10.1 (H) 4.2 - 5.6 % Est. average glucose 243 mg/dL CBC W/O DIFF Collection Time: 11/19/18  3:05 AM  
Result Value Ref Range WBC 8.0 4.6 - 13.2 K/uL  
 RBC 4.64 4.20 - 5.30 M/uL  
 HGB 13.6 12.0 - 16.0 g/dL HCT 40.8 35.0 - 45.0 % MCV 87.9 74.0 - 97.0 FL  
 MCH 29.3 24.0 - 34.0 PG  
 MCHC 33.3 31.0 - 37.0 g/dL  
 RDW 12.7 11.6 - 14.5 % PLATELET 771 809 - 465 K/uL MPV 10.5 9.2 - 11.8 FL  
GLUCOSE, POC Collection Time: 11/19/18  7:41 AM  
Result Value Ref Range Glucose (POC) 188 (H) 70 - 110 mg/dL DUPLEX CAROTID BILATERAL Collection Time: 11/19/18 10:45 AM  
Result Value Ref Range Right cca dist sys 96.39 cm/s Right CCA dist villareal 19.52 cm/s RIGHT COMMON CAROTID ARTERY MID S 108.36 cm/s RIGHT COMMON CAROTID ARTERY MID D 22.01 cm/s Right CCA prox sys 91.65 cm/s Right CCA prox villareal 10.86 cm/s Right eca sys 100.34 cm/s RIGHT EXTERNAL CAROTID ARTERY D 7.69 cm/s Right ICA dist sys 72.34 cm/s Right ICA dist villareal 23.16 cm/s Right ICA mid sys 47.77 cm/s Right ICA mid villareal 20.58 cm/s Right ICA prox sys 86.54 cm/s Right ICA prox villareal 15.57 cm/s Right vertebral sys 36.10 cm/s RIGHT VERTEBRAL ARTERY D 8.93 cm/s Left CCA dist sys 79.33 cm/s Left CCA dist villareal 15.38 cm/s LEFT COMMON CAROTID ARTERY MID S 89.70 cm/s LEFT COMMON CAROTID ARTERY MID D 10.20 cm/s Left CCA prox sys 117.28 cm/s Left CCA prox villareal 17.10 cm/s Left ECA sys 138.09 cm/s LEFT EXTERNAL CAROTID ARTERY D 5.01 cm/s Left ICA dist sys 70.65 cm/s Left ICA dist villareal 17.10 cm/s Left ICA mid sys 60.66 cm/s Left ICA mid villareal 13.80 cm/s Left ICA prox sys 134.47 cm/s Left ICA prox villareal 33.42 cm/s Left vertebral sys 43.83 cm/s LEFT VERTEBRAL ARTERY D 11.62 cm/s Right subclavian sys 184.0 cm/s Left subclavian sys 92.0 cm/s Signed By: Tamela Reece MD   
 November 19, 2018 12:17 PM

## 2018-11-19 NOTE — ROUTINE PROCESS
Bedside and Verbal shift change report given to Kary Lugo RN (oncoming nurse) by ANIYA Simeon RN (offgoing nurse). Report given with SBAR, Venancio, MAR and Recent Results.

## 2018-11-19 NOTE — CONSULTS
Eh Ga is a 80 y.o., right handed female, with an established history of hypertension, who comes into the hospital when she experienced ringing in the left ear and then developed numbness on the left face arm and leg. She has persistent numbness, and has some weakness also of the left. Since onset she still has numbness but it's less than on onset. She denies ever having a stroke. She is never had a stroke to her knowledge. She is never been admitted with sensory changes or motor changes. She denies any symptoms on the right side. Is been no vision changes, no diplopia no double vision no loss of vision or blurring of vision. She has no language dysfunction. Social History; she's . Lives with her . Smoked till about 18 years ago. No alcohol consumption, no illicit drugs. Worked at [employer]. Family History; mother  from pneumonia, had heart disease. Father  from Alzheimer's. Otherwise healthy. Sister  from heart disease.      Current Facility-Administered Medications   Medication Dose Route Frequency Provider Last Rate Last Dose    insulin lispro (HUMALOG) injection   SubCUTAneous AC&HS Paige Stokes MD        glucose chewable tablet 16 g  4 Tab Oral PRN Paige Stokes MD        glucagon (GLUCAGEN) injection 1 mg  1 mg IntraMUSCular PRN Paige Stokes MD        dextrose (D50W) injection syrg 12.5-25 g  25-50 mL IntraVENous PRN Paige Stokes MD        albuterol-ipratropium (DUO-NEB) 2.5 MG-0.5 MG/3 ML  3 mL Nebulization Q6H RT Marissa Harris MD   3 mL at 18 1335    carvedilol (COREG) tablet 25 mg  25 mg Oral BID WITH MEALS Marissa Harris MD   25 mg at 18 1031    sodium chloride (NS) flush 5-10 mL  5-10 mL IntraVENous Q8H Marissa Harris MD   10 mL at 18 0620    sodium chloride (NS) flush 5-10 mL  5-10 mL IntraVENous PRN Marissa Harris MD        aspirin chewable tablet 81 mg  81 mg Oral DAILY Marissa Harris MD   81 mg at 18 5069    atorvastatin (LIPITOR) tablet 80 mg  80 mg Oral QHS Elan Reyes MD   80 mg at 11/18/18 2132    acetaminophen (TYLENOL) tablet 650 mg  650 mg Oral Q4H PRN Elan Reyes MD        labetalol (NORMODYNE;TRANDATE) injection 5 mg  5 mg IntraVENous Q10MIN PRN Elan Reyes MD        famotidine (PEPCID) tablet 20 mg  20 mg Oral BID Elan Reyes MD   20 mg at 11/19/18 5132    heparin (porcine) injection 5,000 Units  5,000 Units SubCUTAneous Q8H Elan Reyes MD   5,000 Units at 11/19/18 5100    budesonide (PULMICORT) 500 mcg/2 ml nebulizer suspension  500 mcg Nebulization BID RT Elan Reyes MD   Stopped at 11/19/18 0800       Past Medical History:   Diagnosis Date    Arthritis     Asthma     COPD [facility]chronic obstructive pulmonary disease) ([facility] [address] [facility]     Gall stones     GERD (gastroesophageal reflux disease)     HTN (hypertension)     Kidney stone     Microscopic hematuria     Serious allergic reaction with severe difficult breathing 12/2011       Past Surgical History:   Procedure Laterality Date    HX CHOLECYSTECTOMY      HX GYN      HX ORTHOPAEDIC      HX UROLOGICAL  4/22/2014    [facility], Dr. 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    Serious allergic reaction with severe difficult breathing R06.03, T78.40XA    Pyelonephritis, unspecified N12    Staghorn calculus N20.0    Vitamin D deficiency E55.9    Microscopic hematuria R31.29    Proteinuria R80.9    Hyperlipidemia LDL goal < 100 E78.5    TIA (transient ischemic attack) G45.9         Review of Systems:   As above otherwise 11 point review of systems negative including;   Constitutional no fever or chills  Skin denies rash or itching  HENT  Denies tinnitus, hearing lose  Eyes denies diplopia vision lose  Respiratory denies shortness of breath  Cardiovascular denies chest pain, dyspnea on exertion  Gastrointestinal denies nausea, vomiting, diarrhea, constipation  Genitourinary denies incontinence  Musculoskeletal denies joint pain or swelling  Endocrine denies weight change  Hematology denies easy bruising or bleeding   Neurological as above in HPI      PHYSICAL EXAMINATION:      VITAL SIGNS:    Visit Vitals  /65   Pulse 74   Temp 97.4 °F (36.3 °C)   Resp 16   Ht 5' (1.524 m)   Wt 91.2 kg (201 lb)   SpO2 94%   BMI 39.26 kg/m²       GENERAL: The patient is well developed, well nourished, and in no apparent distress. EXTREMITIES: No clubbing, cyanosis, or edema is identified. Pulses 2+ and symmetrical.  Muscle tone is normal.  HEAD:   Ear, nose, and throat appear to be without trauma. The patient is normocephalic. NEUROLOGIC EXAMINATION    MENTAL STATUS: The patient is awake, alert, and oriented x 4. Fund of knowledge is adequate. Speech is fluent and memory appears to be intact, both long and short term. CRANIAL NERVES: II  Visual fields are full to confrontation. Funduscopic examination reveals flat disks bilaterally. Pupils are both 4 mm and briskly reactive to light and accommodation. III, IV, VI  Extraocular movements are intact and there is no nystagmus. V  Facial sensation is depressed to pin on the left side. VII  Face is symmetrical.   VIII - Hearing is present. IX, X, [address] rises symmetrically. Gag is present. Tongue is in the midline. XI - Shoulder shrugging and head turning intact  MOTOR:  The patient is weak in the left leg, about 4+/5 in an upper motor neuron distribution. Tone is normal.  Sensory examination is depressed to pinprick, light touch on the left side. Reflexes are 2+ and symmetrical. Plantars are down going. Cerebellar examination reveals no gross ataxia or dysmetria. Gait is left hemiparetic.        Final result (Exam End: 11/18/2018 19:00) Provider Status: Open   Study Result     Brain MR without contrast     HISTORY: Dizziness and numbness on left side.     COMPARISON: CT 11/18/2018     TECHNIQUE: Brain scanned with sagittal and axial T1W scans, axial T2W , axial  FLAIR, axial diffusion weighted images and SWAN.    FINDINGS:      Limitations: Diagnostic but motion related artifact on all pulse sequences  except for diffusion.     Cerebral parenchyma: 1 cm focus of diffusion restriction in the right thalamus  consistent with an acute lacunar infarct. No hemorrhage. No mass effect  producing lesions.     Brain volumes and ventricular system: Normal in size and morphology for the  patient's age.     Midline structures: Normal.     Cerebellum: Normal.     Brainstem: Normal.     Vascular system: Expected arterial flow voids are present at the base of brain.     Calvarium and skull base: Normal.     Paranasal sinuses and mastoid air cells: Clear.     Visualized orbits: Normal.     Visualized upper cervical spine: Normal.     IMPRESSION  Impression:        Acute right thalamic lacunar infarct.  -Preliminary reading provided by Dr. Cierra Noel at 2240 hours 11/18/2018.            I have reviewed the above imagines myself.        CBC:   Lab Results   Component Value Date/Time    WBC 8.0 11/19/2018 03:05 AM    RBC 4.64 11/19/2018 03:05 AM    HGB 13.6 11/19/2018 03:05 AM    HCT 40.8 11/19/2018 03:05 AM    PLATELET 478 95/78/9151 03:05 AM     BMP:   Lab Results   Component Value Date/Time    Glucose 170 (H) 11/18/2018 03:20 PM    Sodium 137 11/18/2018 03:20 PM    Potassium 3.9 11/18/2018 03:20 PM    Chloride 107 11/18/2018 03:20 PM    CO2 24 11/18/2018 03:20 PM    BUN 13 11/18/2018 03:20 PM    Creatinine 1.00 11/18/2018 03:20 PM    Calcium 8.7 11/18/2018 03:20 PM     CMP:   Lab Results   Component Value Date/Time    Glucose 170 (H) 11/18/2018 03:20 PM    Sodium 137 11/18/2018 03:20 PM    Potassium 3.9 11/18/2018 03:20 PM    Chloride 107 11/18/2018 03:20 PM    CO2 24 11/18/2018 03:20 PM    BUN 13 11/18/2018 03:20 PM    Creatinine 1.00 11/18/2018 03:20 PM    Calcium 8.7 11/18/2018 03:20 PM    Anion gap 6 11/18/2018 03:20 PM    BUN/Creatinine ratio 13 11/18/2018 03:20 PM    Alk. phosphatase 90 11/18/2018 03:20 PM    Protein, total 8.7 (H) 11/18/2018 03:20 PM    Albumin 4.2 11/18/2018 03:20 PM    Globulin 4.5 (H) 11/18/2018 03:20 PM    A-G Ratio 0.9 11/18/2018 03:20 PM     Coagulation:   Lab Results   Component Value Date/Time    Prothrombin time 13.7 09/21/2015 03:00 PM    INR 1.0 09/21/2015 03:00 PM    aPTT 34.3 09/21/2015 03:00 PM     Cardiac markers:   Lab Results   Component Value Date/Time    CK 45 11/18/2018 03:20 PM    CK-MB Index 2.2 11/18/2018 03:20 PM      11/19/2018  4:17 AM - [place], Lab In [employer]     Component Value Flag Ref Range Units Status   Hemoglobin A1c 10.1 Abnormally high   H  4.2 - 5.6 % Final   Comment:   (NOTE)   HbA1C Interpretive Ranges   <5.7              Normal   5.7 - 6.4         Consider Prediabetes   >6.5              Consider Diabetes    Est. average glucose 243    mg/dL Final         Impression: Patient with a right thalamic stroke who has risk factors including newly diagnosed diabetes and hypertension. The diabetes appears to be poorly controlled, uncontrolled with a hemoglobin A1c of 10. This is a new subcortical stroke in this patient. Plan: Needs to have her diabetes controlled. Continue to control her blood pressure. Her LDL cholesterol at less than 70. Full dose aspirin therapy. Thank you for allowing me evaluate this patient. PLEASE NOTE:   This document has been produced using voice recognition software. Unrecognized errors in transcription may be present.

## 2018-11-19 NOTE — PROGRESS NOTES
[facility] Stroke Education Booklet and Stroke Education provided to patient and spouse/SO and the following topics were discussed 1. Patients personal risk factors for stroke are hypertension, family history, hyperlipidemia and obesity 2. Warning signs of Stroke: * Sudden numbness or weakness of the face, arm or leg, especially on one side of The body * Sudden confusion, trouble speaking or understanding * Sudden trouble seeing in one or both eyes * Sudden trouble walking, dizziness, loss of balance or coordination * Sudden severe headache with no known cause 3. Importance of activation Emergency Medical Services ( 9-1-1 ) immediately if  you experience any warning signs of stroke. 4. Be sure and schedule a follow-up appointment with your primary care doctor or any specialists as instructed. 5. You must take medicine every day to treat your risk factors for stroke. Be sure to take your medicines exactly as your doctor tells you: no more, no less. Know what your medicines are for , what they do. Anti-thrombotics /anticoagulants can help prevent strokes. You are taking the following medicine(s)  See [facility] 6. Smoking and second-hand smoke greatly increase your risk of stroke, cardiovascular disease and death. Smoking history stopped smoking 13 years ago.

## 2018-11-19 NOTE — DIABETES MGMT
Glycemic Control Plan of Care 
 
T2DM new diagnosis with this admission. Current A1c of 10.1% (11/19/2018). See separate notes, 11/19/2018, for education and training including BG meter and insulin training. Patient required repeated coaching during the training. Recommend home health f/u for additional BG meter training and if patient is sent home on insulin. Patient will need prescription for BG meter. She received a sample of Contour blood glucose meter which has limited lancets (9) and test strips (9). POC BG range on 11/18/2018: 176 - 176 mg/dL. POC BG report on 11/19/2018 at time of review: 188, 180 mg/dL. Recommendation(s): 
1.) Called Dr. Jules Beck and obtained order for correctional lispro insulin. 2.) Dual therapy for T2DM plus lifestyle change. 3.) Home health f/u for continued education: BG monitoring and if sent on insulin. Patient required repeated coaching during procedure of drawing and injection. 4.) Patient will need prescription for BG meter. She received a sample of Contour blood glucose meter which has limited lancets (9) and test strips (9). Assessment: 
Patient is 80year old with past medical history including COPD, asthma, hyperlipidemia, GERD, hypertension, arthritis - was admitted on 11/18/2018 with report of acute onset of left sided facial numbness and left sided weakness. Noted: 
Acute right thalamic lacunar infarct. Newly diagnosed T2DM with current A1c of 10.1% (11/19/2018). Most recent blood glucose values: 
 
Results for Holger Wheeler (MRN [id number]) as of 11/19/2018 18:27 Ref. Range 11/18/2018 15:32 11/18/2018 21:31 GLUCOSE,FAST - POC Latest Ref Range: 70 - 110 mg/dL 176 (H) 176 (H) Results for Holger Wheeler (MRN [id number]) as of 11/19/2018 18:27 Ref. Range 11/19/2018 07:41 11/19/2018 16:28  
GLUCOSE,FAST - POC Latest Ref Range: 70 - 110 mg/dL 188 (H) 180 (H) Current A1C: 10.1% (11/19/2018) which is equivalent to estimated average blood glucose of 243 mg/dL during the past 2-3 months. Current hospital diabetes medications: 
Correctional lispro insulin ACHS. Normal sensitivity dose. Total daily dose insulin requirement previous day: 11/18/2018 None. Home diabetes medications: None. Patient with newly diagnosed type 2 diabetes mellitus with this hospital admission. Diet: Cardiac regular; consistent carb 1800kcal. 
 
Goals:  Blood glucose will be within target range of  mg/dL by 11/22/2018 Education:  _X__  Refer to Diabetes Education Record: 11/19/2018 
           ___  Education not indicated at this time Tika Kelly RN [facility] Pager: [phone]

## 2018-11-19 NOTE — PROGRESS NOTES
Reason for Admission:   TIA (transient ischemic attack) RRAT Score:     23 Resources/supports as identified by patient/family:   The patient has [employer] and Medicaid at home. Top Challenges facing patient (as identified by patient/family and CM): Finances/Medication cost?     The patient did not report any financial problems. She states that she can obtain her medications from the pharmacy, and take her medications as directed. Transportation       Support system or lack thereof? Her  is her caregiver as per patient. Living arrangements? Lives with her . Self-care/ADLs/Cognition? A/Ox4. She needs some help with her IADLs- her  helps with cooking; she states that she can perform her ADLs. Current Advanced Directive/Advance Care Plan (ACP):   no; refused completing an ACP at this time. Plan for utilizing home health:    no; no home health orders at this time. Likelihood of readmission:   HIGH Transition of Care Plan:              Initial assessment completed with patient. Face sheet information confirmed:  yes. This patient lives in a apartment with her . Prior to hospitalization, patient was considered to be independent : no . If not independent,  patient needs assist with her ADL/IADLS- her  helps her. Cognitive status of patient:  oriented to time, place, person and situation The patient's  will be available to transport patient home upon discharge. The patient already has walker available in the home. She does not have home oxygen or nebulizer at home. She ambulates with a walker; 3 falls reported prior to walker use. She does not have any concerns for discharge. Patient is not currently active with home health. Patient has not stayed in a skilled nursing facility or rehab recently. Currently, the discharge plan is Home. Care Management Interventions PCP Verified by CM: Yes Last Visit to PCP: 11/13/18 Mode of Transport at Discharge: Self Transition of Care Consult (CM Consult): Discharge Planning Discharge Durable Medical Equipment: (She has a walker at home.) Speech Therapy Consult: Yes Current Support Network: Lives with Spouse Confirm Follow Up Transport: Family Plan discussed with Pt/Family/Caregiver: Yes Discharge Location Discharge Placement: Home with family assistance Danis Romano RN [facility] Care Management [phone]

## 2018-11-19 NOTE — INTERDISCIPLINARY ROUNDS
Interdisciplinary STROKE Rounds Recommendations:  
 
Recommendations are as follows: 1. Dr. Carlee Rider has been consulted and will see the patient today. 2. Continue education about stroke risk factors, stroke recognition and medication compliance. 3. A1C of 10.1. Diabetes educator consulted. Stroke Meds currently prescribed: ASA 81 mg, Lipitor 80 (LDL of 145.4) DVT prophylaxis: heparin sq Tests ordered:carotids, echo Discharge Plan: TBD, PT/OT/ST consulted Patient admitted for: stroke Advanced Imaging done 11/18  shows R thalamic CVA on MRI. NIH is 1 Discipline Participation: Interdisciplinary rounds were conducted by: 
Dr. Huy Duarte, Neuroscience Medical director,  
Waddell Essex RN, stroke coordinator Evert Bloch, SLP, BULMARO Gonzalez liaison, Juan Rosario RN, the patient's nurse. Discussion included patient's current condition, any tests and patient's expected discharge outcome.

## 2018-11-19 NOTE — PROGRESS NOTES
Problem: Falls - Risk of 
Goal: *Absence of Falls Document Sybil Malone Fall Risk and appropriate interventions in the flowsheet. Outcome: Progressing Towards Goal 
Fall Risk Interventions: 
  
 
  
 
Medication Interventions: Bed/chair exit alarm, Patient to call before getting OOB Elimination Interventions: Bed/chair exit alarm, Toileting schedule/hourly rounds, Call light in reach History of Falls Interventions: Bed/chair exit alarm Problem: Pressure Injury - Risk of 
Goal: *Prevention of pressure injury Document Giorgio Scale and appropriate interventions in the flowsheet. Outcome: Progressing Towards Goal 
Pressure Injury Interventions: 
Sensory Interventions: Assess changes in LOC Moisture Interventions: Absorbent underpads Activity Interventions: Pressure redistribution bed/mattress(bed type) Mobility Interventions: Turn and reposition approx. every two hours(pillow and wedges)

## 2018-11-19 NOTE — DIABETES MGMT
Diabetes Patient/Family Education RecordFactors That  May Influence Patients Ability  to Learn or  Comply with Recommendations []   Language barrier    []   Cultural needs   []   Motivation  
 []   Cognitive limitation    []   Physical   [x]   Education  
 []   Physiological factors   []   Hearing/vision/speaking impairment   []   Gnosticism beliefs []   Financial factors   []  Other:   []  No factors identified at this time. Person Instructed: [x]   Patient   [x]   Family:  visiting at bedside but stated that he's very scared of needle. So he won't be much help because because he felt weak and shaky just watching the patient during the training. []  Other Preference for Learning: 
 [x]   Verbal   [x]   Written   [x]  Demonstration Level of Comprehension & Competence:   
[x]  Good                                      [] Fair                                     []  Poor                             []  Needs Reinforcement  
[x]  Teachback completed Education Component:  
[x]  Medication management, including how to administer insulin (if appropriate) and potential medication interactions: Yes. Patient was educated about list of oral diabetes meds and how they work. Patient was also educated on types of insulin specifically long and fast acting, how they work, and completed insulin training. Recommend referral for home health f/u for further education, training, and observation to make sure that patient is able to do it correctly. Patient required repeated coaching during training. [x]  Nutritional management -obtain usual meal pattern: Yes. Patient received education about the importance of eating 3 meals daily and serving size/portion control of carbs (starches, fruits, dairy) and limiting intake of concentrated sweets. [x]  Exercise: Yes. Patient verbalized understanding to follow her medical provider's recommendation regarding regular physical exercise. [x]  Signs, symptoms, and treatment of hyperglycemia and hypoglycemia: Yes. Patient verbalized understanding about hyperglycemia, symptoms, causes, treatment, prevention, and when to call her medical provider. [x] Prevention, recognition and treatment of hyperglycemia and hypoglycemia: Yes. Patient verbalized understanding of hypoglycemia, symptoms, causes, how to treat, how to prevent, when to contact her medical provider, and when to call 911 for emergency medical assistance if unable to treat low blood sugar above 70. [x]  Importance of blood glucose monitoring and how to obtain a blood glucose meter: Yes. See notes below for detailed information. Education included about the following recommendations: 
Fasting blood sugar range before meals:  Random blood sugar two hours after meal: <180 [x]  Instruction on use of the blood glucose meter: Yes. See notes below for detailed information. Recommend home health referral to continue education, training and practice. Patient needed repeated coaching during the procedure. [x]  Discuss the importance of HbA1C monitoring:  Yes. See notes below for detailed information. [x]  Sick day guidelines: Yes. [x]  Proper use and disposal of lancets, needles, syringes or insulin pens (if appropriate): Yes. [x]  Potential long-term complications (retinopathy, kidney disease, neuropathy, foot care): Yes. [x] Information about whom to contact in case of emergency or for more information: Yes   
[x]  Goal:  Patient/family will demonstrate understanding of Diabetes Self Management Skills by: 11/26/2018 Plan for post-discharge education or self-management support: 
  [x] Outpatient class schedule provided            [] Patient Declined 
  [] Scheduled for outpatient classes (date) _______ Verify: 
Does patient understand how diabetes medications work? Educated patient about list of oral diabetes meds and how they work.  At this time, patient completed insulin education due to elevated A1c level. She was educated on different types of insulin specifically long acting and fast acting insulin. She completed insulin training but will benefit with home health f/u for continued insulin education and administration because she needed continued coaching during training. Does patient know what their most recent A1c is? The patient was admitted with new diagnosis of type 2 diabetes mellitus. She received education about A1c and verbalized understanding that it is equivalent to estimated average blood glucose during the past 2-3 months. She verbalized understanding that her current A1c is 10.1% (11/19/2018) and the recommended A1c level for most people with diabetes is <7%. Patient verbalized understanding that she will need to take diabetes meds as prescribed, serving size/poriton control of carbs (starches, fruits, dairy) and limiting intake of concentrated sweets, and performing regular physical exercise recommended by her medical provider - in order to help gradually lower her A1c. Does patient monitor glucose at home? Patient is newly diagnosed with type 2 diabetes mellitus. I gave her a sample of Contour BG meter to use. She and her  verbalized understanding that it only has 9 lancets and 9 test strips left to use. They verbalized understanding that she will need to get her permanent meter. She has Medicare and Medicaid which will cover the cost BG meter and testing supplies. Does patient have a glucometer, testing supplies or difficulty obtaining diabetes medications? Patient is newly diagnosed with type 2 diabetes mellitus with this admission. Patient stated that she has both Medicare and Medicaid to cover cost of blood glucose meter and testing supplies, and to cover cost of diabetes meds. Patient completed BG meter training but recommend home health f/u for continued education and training because she needed coaching during the  Procedure. Landon Salazar RN 
[facility] [phone]

## 2018-11-19 NOTE — PROGRESS NOTES
Problem: Dysphagia (Adult) Goal: *Acute Goals and Plan of Care (Insert Text) Patient will: 1. Tolerate PO trials with 0 s/s overt distress in 4/5 trials-met Recommend:  
Regular diet with thin liquids Meds as tolerated General safe swallow precautions Outcome: Resolved/Met Date Met: 11/19/18 Speech LAnguage Pathology bedside swallow evaluation AND DISCHARGE Patient: Jaylene Bumpers (78 y.o. female) Date: 11/19/2018 Primary Diagnosis: TIA (transient ischemic attack) Precautions: Aspiration ASSESSMENT : 
Clinical beside swallow eval completed per MD orders. Pt A&Ox4, denying dysphagia or speech changes. Speech/voice within functional limits. Oral mech examination revealed structures functional for speech and deglutition. Pt observed with thin liquids +/- straw via single sips and successive swallows with timely swallow initiation, adequate laryngeal elevation to palpation and no overt s/sx aspiration. Pt demo positive rotary chew and thorough oral clearance with regular solids with no overt s/sx aspiration. Pt safe for regular diet with thin liquids, meds as tolerated with general safe swallow precautions. Pt educated with regard to s/sx aspiration, aspiration risk, diet recs and role of SLP. Pt able to verbalize understanding. Will sign off. Please re-consult as indicated. D/w RN. PLAN : 
Recommendations and Planned Interventions: 
No formal ST needs ID'd for dysphagia. Eval only. Discharge Recommendations: Do not anticipate further SLP needs upon discharge SUBJECTIVE:  
Patient stated I think I'm swallowing fine. OBJECTIVE:  
 
Past Medical History:  
Diagnosis Date  Arthritis  Asthma  COPD (chronic obstructive pulmonary disease) (HCC)  Gall stones  GERD (gastroesophageal reflux disease)  HTN (hypertension)  Kidney stone  Microscopic hematuria  Serious allergic reaction with severe difficult breathing 12/2011 Past Surgical History:  
Procedure Laterality Date  HX CHOLECYSTECTOMY  HX GYN    
 HX ORTHOPAEDIC    
 HX UROLOGICAL  4/22/2014 [facility], Dr. Eden Corona, Cystoscopy, left retrograde, left double-J catheter Prior Level of Function/Home Situation: 
Home Situation Home Environment: Apartment(senior living apartments) # Steps to Enter: 0(elevator) One/Two Story Residence: One story Living Alone: No(Lives with her spouse.) Support Systems: Spouse/Significant Other/Partner Patient Expects to be Discharged to[de-identified] Private residence Current DME Used/Available at Home: [address], [place], straight, Walker, rollator, Wheelchair Tub or Shower Type: (walk-in shower with grab bars) Diet prior to admission: Regular, thin liquids Current Diet:  Regular, thin liquids  Cognitive and Communication Status: 
Neurologic State: Alert Orientation Level: Oriented X4 Cognition: Follows commands, Appropriate decision making Perseveration: No perseveration noted Safety/Judgement: Good awareness of safety precautions Oral Assessment: 
Oral Assessment Labial: No impairment Dentition: Intact Oral Hygiene: Good Lingual: No impairment Velum: No impairment Mandible: No impairment P.O. Trials: 
Patient Position: 45 at [facility] Vocal quality prior to P.O.: No impairment Consistency Presented: Thin liquid; Solid How Presented: Self-fed/presented;Cup/sip;Spoon;Straw;Successive swallows Bolus Acceptance: No impairment Bolus Formation/Control: No impairment Propulsion: No impairment Oral Residue: None Initiation of Swallow: No impairment Laryngeal Elevation: Functional 
Aspiration Signs/Symptoms: None Pharyngeal Phase Characteristics: No impairment, issues, or problems Oral Phase Severity: No impairment Pharyngeal Phase Severity : No impairment GCODESwallowing:  Swallow Current Status CH= 0% 
 Swallow Goal Status CH= 0% 
 Swallow D/C Status CH= 0% The severity rating is based on the following outcomes: Clinical judgment Pain: 
Pt reports 0/10 pain or discomfort prior to eval.  
Pt reports 0/10 pain or discomfort post eval.  
 
After treatment:  
[]            Patient left in no apparent distress sitting up in chair 
[x]            Patient left in no apparent distress in bed 
[x]            Call bell left within reach [x]            Nursing notified 
[]            Caregiver present 
[]            Bed alarm activated COMMUNICATION/EDUCATION:  
[x]            SLP educated pt with regard to compensatory swallow strategies and 
     aspiration/reflux precautions including: small bites/sips, 
     alternate liquids/solids, decrease feeding rate, HOB > 45 with all po, and 
     upright in bed at 30 degrees after po for at least 45 
     minutes. [x]            Patient/family have participated as able in goal setting and plan of care. []            Patient/family agree to work toward stated goals and plan of care. []            Patient understands intent and goals of therapy; neutral about participation. []            Patient is unable to participate in goal setting and plan of care. Thank you for this referral. 
Nahed Williamson M.S., CCC-SLP Speech-Language Pathologist

## 2018-11-20 ENCOUNTER — HOME HEALTH ADMISSION (OUTPATIENT)
Dept: HOME HEALTH SERVICES | Facility: HOME HEALTH | Age: 68
End: 2018-11-20
Payer: MEDICARE

## 2018-11-20 VITALS
WEIGHT: 200.9 LBS | BODY MASS INDEX: 39.44 KG/M2 | OXYGEN SATURATION: 95 % | TEMPERATURE: 97.6 F | RESPIRATION RATE: 18 BRPM | SYSTOLIC BLOOD PRESSURE: 123 MMHG | DIASTOLIC BLOOD PRESSURE: 59 MMHG | HEIGHT: 60 IN | HEART RATE: 76 BPM

## 2018-11-20 LAB — GLUCOSE BLD STRIP.AUTO-MCNC: 178 MG/DL (ref 70–110)

## 2018-11-20 PROCEDURE — 74011250636 HC RX REV CODE- 250/636: Performed by: INTERNAL MEDICINE

## 2018-11-20 PROCEDURE — 94640 AIRWAY INHALATION TREATMENT: CPT

## 2018-11-20 PROCEDURE — 74011250637 HC RX REV CODE- 250/637: Performed by: PSYCHIATRY & NEUROLOGY

## 2018-11-20 PROCEDURE — 74011250637 HC RX REV CODE- 250/637: Performed by: INTERNAL MEDICINE

## 2018-11-20 PROCEDURE — 74011000250 HC RX REV CODE- 250: Performed by: INTERNAL MEDICINE

## 2018-11-20 PROCEDURE — 82962 GLUCOSE BLOOD TEST: CPT

## 2018-11-20 RX ORDER — LANCETS
EACH MISCELLANEOUS
Qty: 1 EACH | Refills: 11 | Status: SHIPPED | OUTPATIENT
Start: 2018-11-20

## 2018-11-20 RX ORDER — SYRINGE-NEEDLE,INSULIN,0.5 ML 30 GX5/16"
SYRINGE, EMPTY DISPOSABLE MISCELLANEOUS
Qty: 60 SYRINGE | Refills: 1 | Status: SHIPPED | OUTPATIENT
Start: 2018-11-20

## 2018-11-20 RX ORDER — INSULIN GLARGINE 100 [IU]/ML
8 INJECTION, SOLUTION SUBCUTANEOUS DAILY
Qty: 1 VIAL | Refills: 1 | Status: SHIPPED | OUTPATIENT
Start: 2018-11-20

## 2018-11-20 RX ORDER — INSULIN PUMP SYRINGE, 3 ML
EACH MISCELLANEOUS
Qty: 1 KIT | Refills: 0 | Status: SHIPPED | OUTPATIENT
Start: 2018-11-20

## 2018-11-20 RX ORDER — ATORVASTATIN CALCIUM 80 MG/1
80 TABLET, FILM COATED ORAL
Qty: 30 TAB | Refills: 0 | Status: SHIPPED | OUTPATIENT
Start: 2018-11-20

## 2018-11-20 RX ORDER — ASPIRIN 325 MG
325 TABLET ORAL DAILY
Qty: 30 TAB | Refills: 0 | Status: SHIPPED | OUTPATIENT
Start: 2018-11-21

## 2018-11-20 RX ORDER — INSULIN GLARGINE 100 [IU]/ML
8 INJECTION, SOLUTION SUBCUTANEOUS DAILY
Status: DISCONTINUED | OUTPATIENT
Start: 2018-11-21 | End: 2018-11-20 | Stop reason: HOSPADM

## 2018-11-20 RX ADMIN — BUDESONIDE 500 MCG: 0.5 INHALANT RESPIRATORY (INHALATION) at 09:13

## 2018-11-20 RX ADMIN — CARVEDILOL 25 MG: 25 TABLET, FILM COATED ORAL at 08:23

## 2018-11-20 RX ADMIN — FAMOTIDINE 20 MG: 20 TABLET ORAL at 08:23

## 2018-11-20 RX ADMIN — IPRATROPIUM BROMIDE AND ALBUTEROL SULFATE 3 ML: .5; 3 SOLUTION RESPIRATORY (INHALATION) at 09:13

## 2018-11-20 RX ADMIN — IPRATROPIUM BROMIDE AND ALBUTEROL SULFATE 3 ML: .5; 3 SOLUTION RESPIRATORY (INHALATION) at 01:57

## 2018-11-20 RX ADMIN — Medication 10 ML: at 07:13

## 2018-11-20 RX ADMIN — ASPIRIN 325 MG ORAL TABLET 325 MG: 325 PILL ORAL at 08:23

## 2018-11-20 RX ADMIN — HEPARIN SODIUM 5000 UNITS: 5000 INJECTION, SOLUTION INTRAVENOUS; SUBCUTANEOUS at 06:55

## 2018-11-20 NOTE — HOME CARE
Rec HC order -d/c noted for today [facility] will follow for SN/PT/OT - D Tamra LONG

## 2018-11-20 NOTE — ROUTINE PROCESS
Bedside and Verbal shift change report given to [address] Carranza (oncoming nurse) by ANIYA Simeon RN (offgoing nurse). Report given with ANJU, Venancio, MAR and Recent Results.

## 2018-11-20 NOTE — PROGRESS NOTES
Pt to be discharged home today with home health. [facility] signed for   home health. Referral sent and confirmed in que with liaison Leloa Marks. Pt states that she has a rollator at home and would not need a rolling walker. Pt's  will provide transportation. No additional needs identified. Raiza Awan RN, BSN 
[phone]

## 2018-11-21 ENCOUNTER — HOME CARE VISIT (OUTPATIENT)
Dept: HOME HEALTH SERVICES | Facility: HOME HEALTH | Age: 68
End: 2018-11-21

## 2018-11-24 ENCOUNTER — HOME CARE VISIT (OUTPATIENT)
Dept: SCHEDULING | Facility: HOME HEALTH | Age: 68
End: 2018-11-24
Payer: MEDICARE

## 2018-11-24 VITALS
TEMPERATURE: 97.9 F | SYSTOLIC BLOOD PRESSURE: 152 MMHG | OXYGEN SATURATION: 97 % | HEART RATE: 73 BPM | DIASTOLIC BLOOD PRESSURE: 90 MMHG | RESPIRATION RATE: 16 BRPM

## 2018-11-24 PROCEDURE — 3331090002 HH PPS REVENUE DEBIT

## 2018-11-24 PROCEDURE — 3331090001 HH PPS REVENUE CREDIT

## 2018-11-24 PROCEDURE — 400013 HH SOC

## 2018-11-24 PROCEDURE — G0299 HHS/HOSPICE OF RN EA 15 MIN: HCPCS

## 2018-11-25 PROCEDURE — 3331090001 HH PPS REVENUE CREDIT

## 2018-11-25 PROCEDURE — 3331090002 HH PPS REVENUE DEBIT

## 2018-11-26 ENCOUNTER — HOME CARE VISIT (OUTPATIENT)
Dept: HOME HEALTH SERVICES | Facility: HOME HEALTH | Age: 68
End: 2018-11-26
Payer: MEDICARE

## 2018-11-26 PROCEDURE — 3331090002 HH PPS REVENUE DEBIT

## 2018-11-26 PROCEDURE — 3331090001 HH PPS REVENUE CREDIT

## 2018-11-27 ENCOUNTER — HOME CARE VISIT (OUTPATIENT)
Dept: SCHEDULING | Facility: HOME HEALTH | Age: 68
End: 2018-11-27
Payer: MEDICARE

## 2018-11-27 VITALS
SYSTOLIC BLOOD PRESSURE: 178 MMHG | OXYGEN SATURATION: 96 % | HEART RATE: 82 BPM | TEMPERATURE: 97.9 F | DIASTOLIC BLOOD PRESSURE: 110 MMHG

## 2018-11-27 PROCEDURE — 3331090001 HH PPS REVENUE CREDIT

## 2018-11-27 PROCEDURE — G0300 HHS/HOSPICE OF LPN EA 15 MIN: HCPCS

## 2018-11-27 PROCEDURE — 3331090002 HH PPS REVENUE DEBIT

## 2018-11-27 PROCEDURE — G0151 HHCP-SERV OF PT,EA 15 MIN: HCPCS

## 2018-11-28 ENCOUNTER — HOME CARE VISIT (OUTPATIENT)
Dept: HOME HEALTH SERVICES | Facility: HOME HEALTH | Age: 68
End: 2018-11-28
Payer: MEDICARE

## 2018-11-28 PROCEDURE — 3331090002 HH PPS REVENUE DEBIT

## 2018-11-28 PROCEDURE — 3331090001 HH PPS REVENUE CREDIT

## 2018-11-28 PROCEDURE — G0157 HHC PT ASSISTANT EA 15: HCPCS

## 2018-11-29 ENCOUNTER — HOME CARE VISIT (OUTPATIENT)
Dept: HOME HEALTH SERVICES | Facility: HOME HEALTH | Age: 68
End: 2018-11-29
Payer: MEDICARE

## 2018-11-29 ENCOUNTER — HOME CARE VISIT (OUTPATIENT)
Dept: SCHEDULING | Facility: HOME HEALTH | Age: 68
End: 2018-11-29
Payer: MEDICARE

## 2018-11-29 VITALS — SYSTOLIC BLOOD PRESSURE: 142 MMHG | OXYGEN SATURATION: 97 % | HEART RATE: 88 BPM | DIASTOLIC BLOOD PRESSURE: 78 MMHG

## 2018-11-29 VITALS
OXYGEN SATURATION: 96 % | DIASTOLIC BLOOD PRESSURE: 90 MMHG | HEART RATE: 94 BPM | RESPIRATION RATE: 18 BRPM | SYSTOLIC BLOOD PRESSURE: 160 MMHG | TEMPERATURE: 98.1 F

## 2018-11-29 PROCEDURE — G0300 HHS/HOSPICE OF LPN EA 15 MIN: HCPCS

## 2018-11-29 PROCEDURE — 3331090002 HH PPS REVENUE DEBIT

## 2018-11-29 PROCEDURE — G0152 HHCP-SERV OF OT,EA 15 MIN: HCPCS

## 2018-11-29 PROCEDURE — 3331090001 HH PPS REVENUE CREDIT

## 2018-11-30 VITALS
OXYGEN SATURATION: 98 % | SYSTOLIC BLOOD PRESSURE: 178 MMHG | TEMPERATURE: 97.9 F | TEMPERATURE: 98 F | SYSTOLIC BLOOD PRESSURE: 142 MMHG | DIASTOLIC BLOOD PRESSURE: 110 MMHG | DIASTOLIC BLOOD PRESSURE: 80 MMHG | HEART RATE: 82 BPM | HEART RATE: 86 BPM | OXYGEN SATURATION: 96 %

## 2018-11-30 PROCEDURE — 3331090001 HH PPS REVENUE CREDIT

## 2018-11-30 PROCEDURE — 3331090002 HH PPS REVENUE DEBIT

## 2018-12-01 PROCEDURE — 3331090002 HH PPS REVENUE DEBIT

## 2018-12-01 PROCEDURE — 3331090001 HH PPS REVENUE CREDIT

## 2018-12-02 PROCEDURE — 3331090001 HH PPS REVENUE CREDIT

## 2018-12-02 PROCEDURE — 3331090002 HH PPS REVENUE DEBIT

## 2018-12-03 ENCOUNTER — HOME CARE VISIT (OUTPATIENT)
Dept: SCHEDULING | Facility: HOME HEALTH | Age: 68
End: 2018-12-03
Payer: MEDICARE

## 2018-12-03 PROCEDURE — 3331090002 HH PPS REVENUE DEBIT

## 2018-12-03 PROCEDURE — G0157 HHC PT ASSISTANT EA 15: HCPCS

## 2018-12-03 PROCEDURE — 3331090001 HH PPS REVENUE CREDIT

## 2018-12-04 ENCOUNTER — HOME CARE VISIT (OUTPATIENT)
Dept: SCHEDULING | Facility: HOME HEALTH | Age: 68
End: 2018-12-04
Payer: MEDICARE

## 2018-12-04 VITALS
HEART RATE: 75 BPM | RESPIRATION RATE: 18 BRPM | OXYGEN SATURATION: 97 % | SYSTOLIC BLOOD PRESSURE: 140 MMHG | DIASTOLIC BLOOD PRESSURE: 80 MMHG | TEMPERATURE: 98.1 F

## 2018-12-04 PROCEDURE — G0158 HHC OT ASSISTANT EA 15: HCPCS

## 2018-12-04 PROCEDURE — 3331090001 HH PPS REVENUE CREDIT

## 2018-12-04 PROCEDURE — G0300 HHS/HOSPICE OF LPN EA 15 MIN: HCPCS

## 2018-12-04 PROCEDURE — 3331090002 HH PPS REVENUE DEBIT

## 2018-12-05 ENCOUNTER — HOME CARE VISIT (OUTPATIENT)
Dept: SCHEDULING | Facility: HOME HEALTH | Age: 68
End: 2018-12-05
Payer: MEDICARE

## 2018-12-05 VITALS
OXYGEN SATURATION: 96 % | HEART RATE: 70 BPM | DIASTOLIC BLOOD PRESSURE: 60 MMHG | TEMPERATURE: 97.3 F | RESPIRATION RATE: 16 BRPM | SYSTOLIC BLOOD PRESSURE: 122 MMHG

## 2018-12-05 VITALS
SYSTOLIC BLOOD PRESSURE: 122 MMHG | OXYGEN SATURATION: 96 % | RESPIRATION RATE: 16 BRPM | DIASTOLIC BLOOD PRESSURE: 60 MMHG | HEART RATE: 70 BPM | TEMPERATURE: 97.3 F

## 2018-12-05 PROCEDURE — 3331090001 HH PPS REVENUE CREDIT

## 2018-12-05 PROCEDURE — G0157 HHC PT ASSISTANT EA 15: HCPCS

## 2018-12-05 PROCEDURE — 3331090002 HH PPS REVENUE DEBIT

## 2018-12-06 ENCOUNTER — HOME CARE VISIT (OUTPATIENT)
Dept: SCHEDULING | Facility: HOME HEALTH | Age: 68
End: 2018-12-06
Payer: MEDICARE

## 2018-12-06 VITALS
HEART RATE: 75 BPM | TEMPERATURE: 98.1 F | RESPIRATION RATE: 18 BRPM | SYSTOLIC BLOOD PRESSURE: 143 MMHG | DIASTOLIC BLOOD PRESSURE: 83 MMHG | OXYGEN SATURATION: 97 %

## 2018-12-06 PROCEDURE — 3331090002 HH PPS REVENUE DEBIT

## 2018-12-06 PROCEDURE — 3331090001 HH PPS REVENUE CREDIT

## 2018-12-06 PROCEDURE — G0300 HHS/HOSPICE OF LPN EA 15 MIN: HCPCS

## 2018-12-07 PROCEDURE — 3331090002 HH PPS REVENUE DEBIT

## 2018-12-07 PROCEDURE — 3331090001 HH PPS REVENUE CREDIT

## 2018-12-08 PROCEDURE — 3331090001 HH PPS REVENUE CREDIT

## 2018-12-08 PROCEDURE — 3331090002 HH PPS REVENUE DEBIT

## 2018-12-09 VITALS
TEMPERATURE: 97.5 F | HEART RATE: 53 BPM | SYSTOLIC BLOOD PRESSURE: 138 MMHG | DIASTOLIC BLOOD PRESSURE: 82 MMHG | OXYGEN SATURATION: 97 %

## 2018-12-09 PROCEDURE — 3331090001 HH PPS REVENUE CREDIT

## 2018-12-09 PROCEDURE — 3331090002 HH PPS REVENUE DEBIT

## 2018-12-10 ENCOUNTER — HOME CARE VISIT (OUTPATIENT)
Dept: SCHEDULING | Facility: HOME HEALTH | Age: 68
End: 2018-12-10
Payer: MEDICARE

## 2018-12-10 PROCEDURE — G0157 HHC PT ASSISTANT EA 15: HCPCS

## 2018-12-10 PROCEDURE — 3331090001 HH PPS REVENUE CREDIT

## 2018-12-10 PROCEDURE — 3331090002 HH PPS REVENUE DEBIT

## 2018-12-11 ENCOUNTER — HOME CARE VISIT (OUTPATIENT)
Dept: SCHEDULING | Facility: HOME HEALTH | Age: 68
End: 2018-12-11
Payer: MEDICARE

## 2018-12-11 VITALS
SYSTOLIC BLOOD PRESSURE: 150 MMHG | TEMPERATURE: 98.1 F | DIASTOLIC BLOOD PRESSURE: 85 MMHG | OXYGEN SATURATION: 96 % | HEART RATE: 78 BPM | RESPIRATION RATE: 18 BRPM

## 2018-12-11 VITALS — SYSTOLIC BLOOD PRESSURE: 150 MMHG | OXYGEN SATURATION: 95 % | HEART RATE: 74 BPM | DIASTOLIC BLOOD PRESSURE: 90 MMHG

## 2018-12-11 PROCEDURE — 3331090002 HH PPS REVENUE DEBIT

## 2018-12-11 PROCEDURE — G0300 HHS/HOSPICE OF LPN EA 15 MIN: HCPCS

## 2018-12-11 PROCEDURE — 3331090001 HH PPS REVENUE CREDIT

## 2018-12-11 PROCEDURE — G0158 HHC OT ASSISTANT EA 15: HCPCS

## 2018-12-12 ENCOUNTER — HOME CARE VISIT (OUTPATIENT)
Dept: SCHEDULING | Facility: HOME HEALTH | Age: 68
End: 2018-12-12
Payer: MEDICARE

## 2018-12-12 PROCEDURE — G0157 HHC PT ASSISTANT EA 15: HCPCS

## 2018-12-12 PROCEDURE — 3331090002 HH PPS REVENUE DEBIT

## 2018-12-12 PROCEDURE — 3331090001 HH PPS REVENUE CREDIT

## 2018-12-13 VITALS
HEART RATE: 85 BPM | DIASTOLIC BLOOD PRESSURE: 78 MMHG | OXYGEN SATURATION: 93 % | SYSTOLIC BLOOD PRESSURE: 132 MMHG | TEMPERATURE: 98.2 F

## 2018-12-13 VITALS
SYSTOLIC BLOOD PRESSURE: 135 MMHG | OXYGEN SATURATION: 96 % | TEMPERATURE: 98.1 F | HEART RATE: 65 BPM | DIASTOLIC BLOOD PRESSURE: 83 MMHG | RESPIRATION RATE: 18 BRPM

## 2018-12-13 PROCEDURE — 3331090002 HH PPS REVENUE DEBIT

## 2018-12-13 PROCEDURE — 3331090001 HH PPS REVENUE CREDIT

## 2018-12-14 ENCOUNTER — HOME CARE VISIT (OUTPATIENT)
Dept: SCHEDULING | Facility: HOME HEALTH | Age: 68
End: 2018-12-14
Payer: MEDICARE

## 2018-12-14 PROCEDURE — G0152 HHCP-SERV OF OT,EA 15 MIN: HCPCS

## 2018-12-14 PROCEDURE — 3331090002 HH PPS REVENUE DEBIT

## 2018-12-14 PROCEDURE — 3331090001 HH PPS REVENUE CREDIT

## 2018-12-15 PROCEDURE — 3331090001 HH PPS REVENUE CREDIT

## 2018-12-15 PROCEDURE — 3331090002 HH PPS REVENUE DEBIT

## 2018-12-16 PROCEDURE — 3331090002 HH PPS REVENUE DEBIT

## 2018-12-16 PROCEDURE — 3331090001 HH PPS REVENUE CREDIT

## 2018-12-17 ENCOUNTER — HOME CARE VISIT (OUTPATIENT)
Dept: SCHEDULING | Facility: HOME HEALTH | Age: 68
End: 2018-12-17
Payer: MEDICARE

## 2018-12-17 VITALS
TEMPERATURE: 97.2 F | OXYGEN SATURATION: 97 % | DIASTOLIC BLOOD PRESSURE: 80 MMHG | SYSTOLIC BLOOD PRESSURE: 132 MMHG | HEART RATE: 64 BPM

## 2018-12-17 PROCEDURE — G0151 HHCP-SERV OF PT,EA 15 MIN: HCPCS

## 2018-12-17 PROCEDURE — 3331090002 HH PPS REVENUE DEBIT

## 2018-12-17 PROCEDURE — 3331090001 HH PPS REVENUE CREDIT

## 2018-12-18 PROCEDURE — 3331090002 HH PPS REVENUE DEBIT

## 2018-12-18 PROCEDURE — 3331090001 HH PPS REVENUE CREDIT

## 2019-01-03 ENCOUNTER — DOCUMENTATION ONLY (OUTPATIENT)
Dept: NEUROLOGY | Age: 69
End: 2019-01-03

## 2019-01-03 NOTE — PROGRESS NOTES
Chart review reveals scheduled [facility] hospital follow-up to discuss TIA.

## 2019-01-07 ENCOUNTER — OFFICE VISIT (OUTPATIENT)
Dept: NEUROLOGY | Age: 69
End: 2019-01-07

## 2019-01-07 VITALS
TEMPERATURE: 98.3 F | SYSTOLIC BLOOD PRESSURE: 136 MMHG | HEIGHT: 60 IN | OXYGEN SATURATION: 96 % | BODY MASS INDEX: 37.46 KG/M2 | RESPIRATION RATE: 16 BRPM | HEART RATE: 62 BPM | WEIGHT: 190.8 LBS | DIASTOLIC BLOOD PRESSURE: 80 MMHG

## 2019-01-07 DIAGNOSIS — Z86.73 HISTORY OF CVA (CEREBROVASCULAR ACCIDENT): Primary | ICD-10-CM

## 2019-01-07 PROBLEM — E66.01 SEVERE OBESITY (HCC): Status: ACTIVE | Noted: 2019-01-07

## 2019-01-07 NOTE — DISCHARGE SUMMARY
Discharge Summary    Patient: Anthony Marlow MRN: [id number]  CSN: [id number]    YOB: 1950  Age: 80 y.o. Sex: female    DOA: 11/18/2018 LOS:  LOS: 2 days   Discharge Date: 11/20/2018     Admission Diagnoses:   L sided weakness    Discharge Diagnoses:    Acute R thalamic CVA  Uncontrolled DM 2 with hyperglycemia  Dyslipidemia   HTN  COPD  GERD  Obesity      Discharge Condition: Stable    PHYSICAL EXAM  Visit Vitals  /59 (BP 1 Location: Left arm, BP Patient Position: Supine)   Pulse 76   Temp 97.6 °F (36.4 °C)   Resp 18   Ht 5' (1.524 m)   Wt 91.1 kg (200 lb 14.4 oz)   SpO2 95%   BMI 39.24 kg/m²       General: In NAD. HEENT: NCAT. Sclerae anicteric. Lungs:  Clear, no wheezes. Effort nonlabored. Heart:  RRR. Abdomen: Soft, NTTP. Extremities: Warm, no ischemia. Psych:   Mood normal.  Neurologic:  Awake and alert, motor nonfocal.    Hospital Course:   See admission H&P for full details of HPI. Patient admitted to stroke unit from ED for further evaluation of L sided weakness. MRI showed evidence for an acute lacunar infarct. Neurology evaluation was obtained and recommendation is for full dose ASA therapy and aggressive management of HTN and DM. She has been evaluated by the diabetes educator with teaching being provided. Patient has been seen by SLP/PT/OT and recommendation is for outpatient physical therapy. She is medically stable for discharge home with outpatient follow up as advised. Consults:   Neurology      Significant Diagnostic Studies:   MRI brain:  IMPRESSION  Impression:        Acute right thalamic lacunar infarct. 2D echo: Interpretation Summary     · Technically difficult study with very poor parasternal and subcostal views. Apical views are of good quality. · Estimated left ventricular ejection fraction is 56 - 60%. Visually measured ejection fraction. Left ventricular moderate concentric hypertrophy.  Normal left ventricular wall motion, no regional wall motion abnormality noted. Age-appropriate left ventricular diastolic function. · Mitral valve thickening. Mitral annular calcification. Trace mitral valve regurgitation. · There is no evidence of pulmonary hypertension. PASP 25mmHg  · Saline contrast was given to evaluate for intracardiac shunt. No shunt seen. IV not working, no shunt seen with color flow. CT head:  IMPRESSION:     1. No clearly acute findings. Exam limitations described above. Discharge Medications:     Discharge Medication List as of 11/20/2018 12:49 PM      START taking these medications    Details   aspirin (ASPIRIN) 325 mg tablet Take 1 Tab by mouth daily. , Print, Disp-30 Tab, R-0      atorvastatin (LIPITOR) 80 mg tablet Take 1 Tab by mouth nightly. , Print, Disp-30 Tab, R-0      Lancets misc Use as directed for blood glucose monitoring., Print, Disp-1 Each, R-11      Insulin Syringe-Needle U-100 (INSULIN SYRINGE) 1 mL 30 gauge x 5/16 syrg Use as directed for insulin administration. , Print, Disp-60 Syringe, R-1      glucose blood VI test strips (ASCENSIA AUTODISC VI, ONE TOUCH ULTRA TEST VI) strip Use as directed for blood glucose monitoring., Print, Disp-60 Strip, R-1      Blood-Glucose Meter monitoring kit Use as directed for blood glucose monitoring., Print, Disp-1 Kit, R-0      insulin glargine (LANTUS) 100 unit/mL injection 8 Units by SubCUTAneous route daily. , Print, Disp-1 Vial, R-1         CONTINUE these medications which have NOT CHANGED    Details   albuterol (PROVENTIL HFA, VENTOLIN HFA, PROAIR HFA) 90 mcg/actuation inhaler Take 2 Puffs by inhalation every four (4) hours as needed for Wheezing or Shortness of Breath (Cough). , Print, Disp-1 Inhaler, R-0      inhalational spacing device ALWAYS USE WITH INHALER, Print, Disp-1 Device, R-0      carvedilol (COREG) 25 mg tablet Historical Med      Potassium Citrate (UROCIT-K 15) 15 mEq TbER Take 15 mEq by mouth two (2) times a day., Normal, Disp-180 Tab, R-3 quinapril (ACCUPRIL) 20 mg tablet Take 1 Tab by mouth nightly., Program, Disp-90 Tab, R-3      fluticasone-salmeterol (ADVAIR DISKUS) 500-50 mcg/dose diskus inhaler Take 1 Puff by inhalation every twelve (12) hours. , Program, Disp-4 Inhaler, R-3         STOP taking these medications       rosuvastatin (CRESTOR) 20 mg tablet Comments:   Reason for Stopping:                   Activity: As tolerated    Diet: Cardiac Diet and Diabetic Diet    Disposition:  Home. Follow-up: with PCP, Balaji Ibrahim MD in [address] [facility]. Vivienne Figueroa MD  [facility].

## 2019-01-07 NOTE — LETTER
1/7/2019 2:28 PM 
 
Patient:  José Miguel Concepcion YOB: 1950 Date of Visit: 1/7/2019 Dear Guilherme Willingham MD 
[address] [facility] [address] VIA Facsimile: [phone] 
 : Thank you for referring Ms. Galo Schwab to me for evaluation/treatment. Below are the relevant portions of my assessment and plan of care. If you have questions, please do not hesitate to call me. I look forward to following Ms. Abdifatah Cerda along with you.  
 
 
 
Sincerely, 
 
 
Khadar Navarrete MD

## 2019-01-07 NOTE — PROGRESS NOTES
Mario Moreno is a 80 y.o. female patient in today for [facility] hospital follow-up to discuss TIA.

## 2021-06-21 ENCOUNTER — TRANSCRIBE ORDER (OUTPATIENT)
Dept: SCHEDULING | Age: 71
End: 2021-06-21

## 2021-06-21 DIAGNOSIS — Z12.31 VISIT FOR SCREENING MAMMOGRAM: Primary | ICD-10-CM

## 2024-01-30 NOTE — ED NOTES
MRI screening completed and faxed. Dr. Solitario Thakkar at bedside evaluating patient for admission at this time. declines